# Patient Record
Sex: FEMALE | Race: ASIAN | Employment: UNEMPLOYED | URBAN - METROPOLITAN AREA
[De-identification: names, ages, dates, MRNs, and addresses within clinical notes are randomized per-mention and may not be internally consistent; named-entity substitution may affect disease eponyms.]

---

## 2017-01-18 PROBLEM — D56.0 ALPHA THALASSEMIA (HCC): Status: ACTIVE | Noted: 2017-01-18

## 2017-01-28 ENCOUNTER — APPOINTMENT (OUTPATIENT)
Dept: LAB | Facility: HOSPITAL | Age: 32
End: 2017-01-28
Attending: OBSTETRICS & GYNECOLOGY
Payer: COMMERCIAL

## 2017-01-28 DIAGNOSIS — Z36.9 ENCOUNTER FOR ANTENATAL SCREENING OF MOTHER: ICD-10-CM

## 2017-01-28 PROCEDURE — 36415 COLL VENOUS BLD VENIPUNCTURE: CPT

## 2017-01-28 PROCEDURE — 81511 FTL CGEN ABNOR FOUR ANAL: CPT

## 2017-01-30 LAB
FAMILY HISTORY OF ANEUPLOIDY: NO
FAMILY HX NEURAL TUBE DEFECT: NO
GESTATIONAL AGE (EXACT): 15.86 WEEKS
INSULIN REQ MATERNAL DIABETES: NO
MATERNAL AGE OF DELIVERY: 31.1 YR
MATERNAL SCREEN INTERPRETATION: NORMAL
MATERNAL WEIGHT: 151 LBS
MOM FOR AFP: 1.66
MOM FOR DIA: 1.32
MOM FOR HCG: 1.28
MOM FOR UE3: 1.02
PATIENT'S AFP: 53 NG/ML
PATIENT'S DIA: 227 PG/ML
PATIENT'S HCG: NORMAL IU/L
PATIENT'S UE3: 0.86 NG/ML

## 2017-02-02 ENCOUNTER — OFFICE VISIT (OUTPATIENT)
Dept: GENETICS | Facility: HOSPITAL | Age: 32
End: 2017-02-02
Attending: GENETIC COUNSELOR, MS
Payer: COMMERCIAL

## 2017-02-02 DIAGNOSIS — D56.0 ALPHA THALASSEMIA (HCC): Primary | ICD-10-CM

## 2017-02-02 PROCEDURE — 96040 HC GENETIC COUNSELING EA 30 MIN: CPT | Performed by: GENETIC COUNSELOR, MS

## 2017-02-02 NOTE — PROGRESS NOTES
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   Ms. Leonela Bonner is a  1, para 0 woman of Asi grandmother had two children who  in the  period or early childhood for unknown reasons. Ms. Lj Kincaid paternal aunt had two unexplained  demises. Ms. Lj Kincaid sister reportedly has alpha-thalassemia trait.   Mr. Stephani Fischer is an only This latter finding differentiates the carrier states of alpha- and beta-thalassemia. Individuals with a single functional alpha-globin gene have hemoglobin H disease. Hemoglobin H disease is characterized by hemolytic anemia with variable severity.   I alpha-thalassemia. Summary and Plan:  Ms. Roshan Howe was referred for genetic counseling because she has cis alpha-thalassemia trait. She is currently 16 weeks pregnant.   Her ’s CBC is not suspicious for alpha-thalassemia trait, but does not

## 2017-02-25 ENCOUNTER — HOSPITAL ENCOUNTER (OUTPATIENT)
Dept: ULTRASOUND IMAGING | Facility: HOSPITAL | Age: 32
Discharge: HOME | End: 2017-02-25
Attending: OBSTETRICS & GYNECOLOGY
Payer: COMMERCIAL

## 2017-02-25 DIAGNOSIS — Z36.89 ENCOUNTER FOR FETAL ANATOMIC SURVEY: ICD-10-CM

## 2017-02-25 PROCEDURE — 76811 OB US DETAILED SNGL FETUS: CPT

## 2017-03-10 ENCOUNTER — OFFICE VISIT (OUTPATIENT)
Dept: PHYSICAL THERAPY | Age: 32
End: 2017-03-10
Attending: OBSTETRICS & GYNECOLOGY
Payer: COMMERCIAL

## 2017-03-10 DIAGNOSIS — O26.899 LOW BACK PAIN DURING PREGNANCY, UNSPECIFIED TRIMESTER: Primary | ICD-10-CM

## 2017-03-10 DIAGNOSIS — M54.50 LOW BACK PAIN DURING PREGNANCY, UNSPECIFIED TRIMESTER: Primary | ICD-10-CM

## 2017-03-10 PROCEDURE — 97161 PT EVAL LOW COMPLEX 20 MIN: CPT

## 2017-03-10 PROCEDURE — 97110 THERAPEUTIC EXERCISES: CPT

## 2017-03-10 NOTE — PROGRESS NOTES
SPINE EVALUATION:   Referring Physician: Dr. Ana Saunders  Diagnosis: Back pain     Date of Service: 3/10/2017     PATIENT SUMMARY   Staci Negro is a 28year old y/o female who presents to therapy today with complaints of low back pain for 2 months.  Pain is co extension: R 4/5; L 4/5   PF: R 4/5; L 4/5  DF: R 4/5; L 4/5     Flexibility:   LE   Hip Flexor: normal  Hamstrings: 60 deg  Piriformis:normal  Quads: normal  Gastroc-soleus: normal     Balance: Normal    Today’s Treatment and Response: Hook lying LTR, jimena

## 2017-03-14 ENCOUNTER — OFFICE VISIT (OUTPATIENT)
Dept: PHYSICAL THERAPY | Age: 32
End: 2017-03-14
Attending: OBSTETRICS & GYNECOLOGY
Payer: COMMERCIAL

## 2017-03-14 PROCEDURE — 97110 THERAPEUTIC EXERCISES: CPT

## 2017-03-14 PROCEDURE — 97112 NEUROMUSCULAR REEDUCATION: CPT

## 2017-03-14 NOTE — PROGRESS NOTES
Dx: Low back pain         Authorized # of Visits:  8        Next MD visit:   Fall Risk: standard         Precautions: Pregnancy 23 weeks             Subjective: Low back pain over the weekend ranging from 3-5/10. Purchased a support belt which helps a lot.

## 2017-03-22 ENCOUNTER — OFFICE VISIT (OUTPATIENT)
Dept: PHYSICAL THERAPY | Age: 32
End: 2017-03-22
Attending: OBSTETRICS & GYNECOLOGY
Payer: COMMERCIAL

## 2017-03-22 PROCEDURE — 97110 THERAPEUTIC EXERCISES: CPT

## 2017-03-22 PROCEDURE — 97112 NEUROMUSCULAR REEDUCATION: CPT

## 2017-03-23 NOTE — PROGRESS NOTES
Dx: Low back pain         Authorized # of Visits:  8        Next MD visit:   Fall Risk: standard         Precautions: Pregnancy 23 weeks             Subjective: Slight pain in the low back on getting up in the morning and getting up from sitting.  Once up a Wall slides x 10 Red t band rows x 20        Standing with 2# wts R/L x 10 each  Alternating sh flexion  Scapular plane abduction  B shoulder  ER         Standing red t band rows x 20         Hamstring stretch R/L 2 x 30 sec hold         Skilled Services

## 2017-04-10 ENCOUNTER — APPOINTMENT (OUTPATIENT)
Dept: PHYSICAL THERAPY | Age: 32
End: 2017-04-10
Attending: OBSTETRICS & GYNECOLOGY
Payer: COMMERCIAL

## 2017-04-12 ENCOUNTER — OFFICE VISIT (OUTPATIENT)
Dept: PHYSICAL THERAPY | Age: 32
End: 2017-04-12
Attending: OBSTETRICS & GYNECOLOGY
Payer: COMMERCIAL

## 2017-04-12 PROCEDURE — 97140 MANUAL THERAPY 1/> REGIONS: CPT

## 2017-04-12 PROCEDURE — 97112 NEUROMUSCULAR REEDUCATION: CPT

## 2017-04-12 PROCEDURE — 97110 THERAPEUTIC EXERCISES: CPT

## 2017-04-13 NOTE — PROGRESS NOTES
Dx: Low back pain         Authorized # of Visits:  8        Next MD visit:   Fall Risk: standard         Precautions: Pregnancy 27 weeks, no supine or prone lying             Subjective: Slight pain in the low back on getting up in the morning and getting bracing x 10  Sit back stretch with neutral spine x 10 Seated LAQ R/L x 10  Hip flexion R/L x 10       4 pt kneeling abd bracing 10 x 5 sec hold Standing alternating sh flexion with 2# wts x 10  B scapular plane abd with 2# wts x 10  B sh ER with 2# wts x

## 2017-04-15 ENCOUNTER — APPOINTMENT (OUTPATIENT)
Dept: LAB | Facility: HOSPITAL | Age: 32
End: 2017-04-15
Attending: OBSTETRICS & GYNECOLOGY
Payer: COMMERCIAL

## 2017-04-15 DIAGNOSIS — Z11.3 SCREENING EXAMINATION FOR VENEREAL DISEASE: ICD-10-CM

## 2017-04-15 DIAGNOSIS — Z36.9 ENCOUNTER FOR ANTENATAL SCREENING OF MOTHER: ICD-10-CM

## 2017-04-15 PROCEDURE — 85018 HEMOGLOBIN: CPT

## 2017-04-15 PROCEDURE — 82950 GLUCOSE TEST: CPT

## 2017-04-15 PROCEDURE — 85014 HEMATOCRIT: CPT

## 2017-04-15 PROCEDURE — 86780 TREPONEMA PALLIDUM: CPT

## 2017-04-15 PROCEDURE — 36415 COLL VENOUS BLD VENIPUNCTURE: CPT

## 2017-04-17 ENCOUNTER — APPOINTMENT (OUTPATIENT)
Dept: PHYSICAL THERAPY | Age: 32
End: 2017-04-17
Attending: OBSTETRICS & GYNECOLOGY
Payer: COMMERCIAL

## 2017-04-19 ENCOUNTER — OFFICE VISIT (OUTPATIENT)
Dept: PHYSICAL THERAPY | Age: 32
End: 2017-04-19
Attending: OBSTETRICS & GYNECOLOGY
Payer: COMMERCIAL

## 2017-04-19 PROCEDURE — 97112 NEUROMUSCULAR REEDUCATION: CPT

## 2017-04-19 PROCEDURE — 97530 THERAPEUTIC ACTIVITIES: CPT

## 2017-04-19 PROCEDURE — 97140 MANUAL THERAPY 1/> REGIONS: CPT

## 2017-04-20 NOTE — PROGRESS NOTES
Dx: Low back pain         Authorized # of Visits:  8        Next MD visit:   Fall Risk: standard         Precautions: Pregnancy 27 weeks, no supine or prone lying             Subjective: Slight pain in the low back on getting up in the morning and getting clam R/L x 20 Standing lumbar extension stretch x 10 Wall slides 2 x 10      Side lying hip abd R/L x 10 4 pt kneeling abd bracing x 10  Sit back stretch with neutral spine x 10 Seated LAQ R/L x 10  Hip flexion R/L x 10 Side lying clam R/L 2 x 10  Hip abd

## 2017-04-26 ENCOUNTER — OFFICE VISIT (OUTPATIENT)
Dept: PHYSICAL THERAPY | Age: 32
End: 2017-04-26
Attending: OBSTETRICS & GYNECOLOGY
Payer: COMMERCIAL

## 2017-04-26 PROCEDURE — 97112 NEUROMUSCULAR REEDUCATION: CPT

## 2017-04-26 PROCEDURE — 97140 MANUAL THERAPY 1/> REGIONS: CPT

## 2017-04-26 PROCEDURE — 97110 THERAPEUTIC EXERCISES: CPT

## 2017-04-27 NOTE — PROGRESS NOTES
Dx: Low back pain         Authorized # of Visits:  8        Next MD visit: every 4 weeks  Fall Risk: standard         Precautions: Pregnancy 29 weeks, no supine or prone lying             Subjective: Slight pain in the low back on getting up in the morning Side lying clam R/L x 20  Hip abd R/L x 20 Sit to stand x 10 Kneeling sit back stretch with neutral spine x 10     Side lying clam R/L x 10 Side lying clam R/L x 20 Standing lumbar extension stretch x 10 Wall slides 2 x 10 Sit to stand with arms outstretch

## 2017-05-10 ENCOUNTER — OFFICE VISIT (OUTPATIENT)
Dept: PHYSICAL THERAPY | Age: 32
End: 2017-05-10
Attending: OBSTETRICS & GYNECOLOGY
Payer: COMMERCIAL

## 2017-05-10 PROCEDURE — 97110 THERAPEUTIC EXERCISES: CPT

## 2017-05-10 PROCEDURE — 97140 MANUAL THERAPY 1/> REGIONS: CPT

## 2017-05-10 PROCEDURE — 97112 NEUROMUSCULAR REEDUCATION: CPT

## 2017-05-10 NOTE — PROGRESS NOTES
Dx: Low back pain         Authorized # of Visits:  8        Next MD visit: every 4 weeks  Fall Risk: standard         Precautions: Pregnancy 29 weeks, no supine or prone lying             Subjective: Slight pain in the low back on getting up in the morning Hook lying bridging x 10 Hook lying bridging x 20  Bent leg lift x 20 Side lying clam R/L x 20  Hip abd R/L x 20 Sit to stand x 10 Kneeling sit back stretch with neutral spine x 10 Sit to stand x 20    Side lying clam R/L x 10 Side lying clam R/L x 20 St Timed Treatment: 45 min  Total Treatment Time: 45 min

## 2017-06-20 PROCEDURE — 87186 SC STD MICRODIL/AGAR DIL: CPT | Performed by: OBSTETRICS & GYNECOLOGY

## 2017-06-20 PROCEDURE — 87147 CULTURE TYPE IMMUNOLOGIC: CPT | Performed by: OBSTETRICS & GYNECOLOGY

## 2017-06-20 PROCEDURE — 87081 CULTURE SCREEN ONLY: CPT | Performed by: OBSTETRICS & GYNECOLOGY

## 2017-06-23 PROBLEM — O99.820 GBS (GROUP B STREPTOCOCCUS CARRIER), +RV CULTURE, CURRENTLY PREGNANT: Status: ACTIVE | Noted: 2017-06-23

## 2017-06-23 PROBLEM — O99.820 GBS (GROUP B STREPTOCOCCUS CARRIER), +RV CULTURE, CURRENTLY PREGNANT (HCC): Status: ACTIVE | Noted: 2017-06-23

## 2017-06-27 ENCOUNTER — TELEPHONE (OUTPATIENT)
Dept: OBGYN UNIT | Facility: HOSPITAL | Age: 32
End: 2017-06-27

## 2017-06-27 ENCOUNTER — OFFICE VISIT (OUTPATIENT)
Dept: PHYSICAL THERAPY | Age: 32
End: 2017-06-27
Attending: OBSTETRICS & GYNECOLOGY
Payer: COMMERCIAL

## 2017-06-27 PROCEDURE — 97140 MANUAL THERAPY 1/> REGIONS: CPT

## 2017-06-27 PROCEDURE — 97110 THERAPEUTIC EXERCISES: CPT

## 2017-06-27 NOTE — PROGRESS NOTES
Dx: Low back pain         Authorized # of Visits:  8        Next MD visit: every 4 weeks  Fall Risk: standard         Precautions: Pregnancy 37 weeks, no supine or prone lying             Subjective: LBP remains mild, no worse as pregnancy has progressed. to stand x 10 Kneeling sit back stretch with neutral spine x 10 Sit to stand x 20 Wall wipes with SB x 10   Side lying clam R/L x 10 Side lying clam R/L x 20 Standing lumbar extension stretch x 10 Wall slides 2 x 10 Sit to stand with arms outstretched 2 x Skilled Services: Stretching, scapula and LE strengthening, [facility]    Charges: EX 1,  MT 2      Total Timed Treatment: 45 min  Total Treatment Time: 45 min

## 2017-06-28 ENCOUNTER — APPOINTMENT (OUTPATIENT)
Dept: OBGYN CLINIC | Facility: HOSPITAL | Age: 32
End: 2017-06-28
Payer: COMMERCIAL

## 2017-06-28 ENCOUNTER — HOSPITAL ENCOUNTER (OUTPATIENT)
Facility: HOSPITAL | Age: 32
Setting detail: OBSERVATION
Discharge: HOME | End: 2017-06-28
Attending: OBSTETRICS & GYNECOLOGY | Admitting: OBSTETRICS & GYNECOLOGY
Payer: COMMERCIAL

## 2017-06-28 VITALS
RESPIRATION RATE: 18 BRPM | SYSTOLIC BLOOD PRESSURE: 102 MMHG | DIASTOLIC BLOOD PRESSURE: 65 MMHG | BODY MASS INDEX: 31.07 KG/M2 | TEMPERATURE: 99 F | HEIGHT: 64 IN | HEART RATE: 87 BPM | WEIGHT: 182 LBS

## 2017-06-28 PROBLEM — Z34.90 PREGNANCY (HCC): Status: ACTIVE | Noted: 2017-06-28

## 2017-06-28 PROBLEM — Z34.90 PREGNANCY: Status: ACTIVE | Noted: 2017-06-28

## 2017-06-28 PROCEDURE — 59412 ANTEPARTUM MANIPULATION: CPT

## 2017-06-28 PROCEDURE — 96372 THER/PROPH/DIAG INJ SC/IM: CPT

## 2017-06-28 PROCEDURE — 10S0XZZ REPOSITION PRODUCTS OF CONCEPTION, EXTERNAL APPROACH: ICD-10-PCS | Performed by: OBSTETRICS & GYNECOLOGY

## 2017-06-28 RX ORDER — TERBUTALINE SULFATE 1 MG/ML
0.25 INJECTION, SOLUTION SUBCUTANEOUS ONCE
Status: COMPLETED | OUTPATIENT
Start: 2017-06-28 | End: 2017-06-28

## 2017-06-28 NOTE — PROCEDURES
ECV    NST reactive prior to procedure  Terbutaline given  Attempted backward clockwise roll x2 without success. FHT assessed by ultrasound between attempts with reassuring FHT noted  Then attempted forward counterclockwise roll x2 without success.     Stalin Painter

## 2017-06-28 NOTE — PROGRESS NOTES
Pt. Discharged home in stable condition. Pt. To f/u at the office for next scheduled appointment. Labor precautions d/w pt, who verbalized understanding.

## 2017-06-28 NOTE — H&P
OB H&P    32 yo  at 37w3d admitted for ECV for breech presentation. Denies contractions, no lof, no vb, +FM. Prenatal course complicated by GBS positive and alpha thalassemia trait.         OB History    Para Term  AB Living   1 0 0 0 0 epidural, patient declines.         Marlin Pat

## 2017-06-28 NOTE — PROGRESS NOTES
S/P attempted version. EFM and TOCO applied to monitor pt x1 hour before dischare home as ordered. ;

## 2017-06-28 NOTE — PROGRESS NOTES
Pt is a 33year old female admitted to 111/-A. Patient presents with:  External Version     Pt is  37w3d intra-uterine pregnancy. History obtained, consents signed. Oriented to room, staff, and plan of care.

## 2017-07-07 ENCOUNTER — HOSPITAL ENCOUNTER (OUTPATIENT)
Facility: HOSPITAL | Age: 32
Setting detail: OBSERVATION
Discharge: HOME | End: 2017-07-07
Attending: OBSTETRICS & GYNECOLOGY | Admitting: OBSTETRICS & GYNECOLOGY
Payer: COMMERCIAL

## 2017-07-07 VITALS
DIASTOLIC BLOOD PRESSURE: 72 MMHG | WEIGHT: 183 LBS | TEMPERATURE: 99 F | BODY MASS INDEX: 31.24 KG/M2 | HEIGHT: 64 IN | SYSTOLIC BLOOD PRESSURE: 118 MMHG | HEART RATE: 75 BPM

## 2017-07-07 PROBLEM — Z34.90 PREGNANT (HCC): Status: ACTIVE | Noted: 2017-07-07

## 2017-07-07 PROBLEM — Z34.90 PREGNANT: Status: ACTIVE | Noted: 2017-07-07

## 2017-07-07 PROCEDURE — 59025 FETAL NON-STRESS TEST: CPT

## 2017-07-07 PROCEDURE — 84112 EVAL AMNIOTIC FLUID PROTEIN: CPT

## 2017-07-07 RX ORDER — MELATONIN
325
COMMUNITY
End: 2017-07-25

## 2017-07-07 NOTE — PROGRESS NOTES
Pt came to L&D with poss ROM. Did have 2 episodes of fluid from vagina that ran down upper leg.  Nothing since she has been in the hospital.   O:  Nursing performed spec exam- nitrazine negative, ferning negative, pooling negative, ROM+ positive  Bedside u/

## 2017-07-07 NOTE — PROGRESS NOTES
8703 pt admit to tr3 with c/o fluid running down her leg when she got out of bed at 6am, she voided, and states fluid remains, wore pad in and has some wetness and sm old bld.  Hx and assessment done, plan f care diiscussed with pt and , orient to ro

## 2017-07-07 NOTE — PROGRESS NOTES
Discharge instructions given, reactive NST, one drop of bld on toilet paper when she wiped, informed may be from spec exam or vag exam.. If continues call MD  Discharge ambulatory in good condition with

## 2017-07-07 NOTE — PROGRESS NOTES
1040 pt return from walking, states no further leakage, pad dry, efm reapplied, Dr Christina Obrien comes by, informed states discharge home when strip reactive again.

## 2017-07-07 NOTE — PROGRESS NOTES
Dr Rosanne Velazquez at bedside, u/s, remains breech and see md notes for fluid amt. Pt up to ambulate for one hour, to come back to room in 30 min for FHR. wearing pad to collect fluid if leaking

## 2017-07-11 ENCOUNTER — SURGERY (OUTPATIENT)
Age: 32
End: 2017-07-11

## 2017-07-11 ENCOUNTER — HOSPITAL ENCOUNTER (INPATIENT)
Facility: HOSPITAL | Age: 32
LOS: 3 days | Discharge: HOME | End: 2017-07-14
Attending: OBSTETRICS & GYNECOLOGY | Admitting: OBSTETRICS & GYNECOLOGY
Payer: COMMERCIAL

## 2017-07-11 ENCOUNTER — ANESTHESIA EVENT (OUTPATIENT)
Dept: OBGYN UNIT | Facility: HOSPITAL | Age: 32
End: 2017-07-11

## 2017-07-11 ENCOUNTER — ANESTHESIA (OUTPATIENT)
Dept: OBGYN UNIT | Facility: HOSPITAL | Age: 32
End: 2017-07-11

## 2017-07-11 PROBLEM — Z98.891 STATUS POST CESAREAN SECTION: Status: ACTIVE | Noted: 2017-07-11

## 2017-07-11 LAB
ANTIBODY SCREEN: NEGATIVE
BASOPHILS # BLD AUTO: 0.03 X10(3) UL (ref 0–0.1)
BASOPHILS NFR BLD AUTO: 0.3 %
BILIRUBIN URINE: NEGATIVE
CONTROL RUN WITHIN 24 HOURS?: YES
EOSINOPHIL # BLD AUTO: 0.07 X10(3) UL (ref 0–0.3)
EOSINOPHIL NFR BLD AUTO: 0.6 %
ERYTHROCYTE [DISTWIDTH] IN BLOOD BY AUTOMATED COUNT: 15.1 % (ref 11.5–16)
GLUCOSE URINE: NEGATIVE
HCT VFR BLD AUTO: 40.6 % (ref 34–50)
HGB BLD-MCNC: 13 G/DL (ref 12–16)
IMMATURE GRANULOCYTE COUNT: 0.07 X10(3) UL (ref 0–1)
IMMATURE GRANULOCYTE RATIO %: 0.6 %
LYMPHOCYTES # BLD AUTO: 1.48 X10(3) UL (ref 0.9–4)
LYMPHOCYTES NFR BLD AUTO: 13.6 %
MCH RBC QN AUTO: 21.4 PG (ref 27–33.2)
MCHC RBC AUTO-ENTMCNC: 32 G/DL (ref 31–37)
MCV RBC AUTO: 66.9 FL (ref 81–100)
MONOCYTES # BLD AUTO: 0.68 X10(3) UL (ref 0.1–0.6)
MONOCYTES NFR BLD AUTO: 6.2 %
NEUTROPHIL ABS PRELIM: 8.58 X10 (3) UL (ref 1.3–6.7)
NEUTROPHILS # BLD AUTO: 8.58 X10(3) UL (ref 1.3–6.7)
NEUTROPHILS NFR BLD AUTO: 78.7 %
NITRITE URINE: NEGATIVE
PH URINE: 6 (ref 5–8)
PLATELET # BLD AUTO: 160 10(3)UL (ref 150–450)
PROTEIN URINE: NEGATIVE
RBC # BLD AUTO: 6.07 X10(6)UL (ref 3.8–5.1)
RED CELL DISTRIBUTION WIDTH-SD: 35.3 FL (ref 35.1–46.3)
RH BLOOD TYPE: POSITIVE
SPEC GRAVITY: 1.01 (ref 1–1.03)
T PALLIDUM AB SER QL IA: NONREACTIVE
UROBILINOGEN URINE: 0.2
WBC # BLD AUTO: 10.9 X10(3) UL (ref 4–13)

## 2017-07-11 PROCEDURE — 86900 BLOOD TYPING SEROLOGIC ABO: CPT | Performed by: OBSTETRICS & GYNECOLOGY

## 2017-07-11 PROCEDURE — 86780 TREPONEMA PALLIDUM: CPT | Performed by: OBSTETRICS & GYNECOLOGY

## 2017-07-11 PROCEDURE — 85025 COMPLETE CBC W/AUTO DIFF WBC: CPT | Performed by: OBSTETRICS & GYNECOLOGY

## 2017-07-11 PROCEDURE — 86850 RBC ANTIBODY SCREEN: CPT | Performed by: OBSTETRICS & GYNECOLOGY

## 2017-07-11 PROCEDURE — 81002 URINALYSIS NONAUTO W/O SCOPE: CPT

## 2017-07-11 PROCEDURE — 86901 BLOOD TYPING SEROLOGIC RH(D): CPT | Performed by: OBSTETRICS & GYNECOLOGY

## 2017-07-11 RX ORDER — CLINDAMYCIN PHOSPHATE 900 MG/50ML
INJECTION INTRAVENOUS
Status: DISCONTINUED | OUTPATIENT
Start: 2017-07-11 | End: 2017-07-14

## 2017-07-11 RX ORDER — ONDANSETRON 2 MG/ML
4 INJECTION INTRAMUSCULAR; INTRAVENOUS ONCE AS NEEDED
Status: DISCONTINUED | OUTPATIENT
Start: 2017-07-11 | End: 2017-07-11 | Stop reason: HOSPADM

## 2017-07-11 RX ORDER — GENTAMICIN SULFATE 40 MG/ML
INJECTION, SOLUTION INTRAMUSCULAR; INTRAVENOUS
Status: DISPENSED
Start: 2017-07-11 | End: 2017-07-11

## 2017-07-11 RX ORDER — SODIUM CHLORIDE, SODIUM LACTATE, POTASSIUM CHLORIDE, CALCIUM CHLORIDE 600; 310; 30; 20 MG/100ML; MG/100ML; MG/100ML; MG/100ML
INJECTION, SOLUTION INTRAVENOUS CONTINUOUS
Status: DISCONTINUED | OUTPATIENT
Start: 2017-07-11 | End: 2017-07-14

## 2017-07-11 RX ORDER — CLINDAMYCIN PHOSPHATE 900 MG/50ML
900 INJECTION INTRAVENOUS EVERY 8 HOURS
Status: DISCONTINUED | OUTPATIENT
Start: 2017-07-11 | End: 2017-07-11

## 2017-07-11 RX ORDER — BISACODYL 10 MG
10 SUPPOSITORY, RECTAL RECTAL
Status: DISCONTINUED | OUTPATIENT
Start: 2017-07-11 | End: 2017-07-14

## 2017-07-11 RX ORDER — KETOROLAC TROMETHAMINE 30 MG/ML
30 INJECTION, SOLUTION INTRAMUSCULAR; INTRAVENOUS ONCE AS NEEDED
Status: COMPLETED | OUTPATIENT
Start: 2017-07-11 | End: 2017-07-11

## 2017-07-11 RX ORDER — IBUPROFEN 600 MG/1
600 TABLET ORAL EVERY 6 HOURS SCHEDULED
Status: DISCONTINUED | OUTPATIENT
Start: 2017-07-12 | End: 2017-07-14

## 2017-07-11 RX ORDER — NALOXONE HYDROCHLORIDE 0.4 MG/ML
0.08 INJECTION, SOLUTION INTRAMUSCULAR; INTRAVENOUS; SUBCUTANEOUS
Status: ACTIVE | OUTPATIENT
Start: 2017-07-11 | End: 2017-07-12

## 2017-07-11 RX ORDER — HYDROCODONE BITARTRATE AND ACETAMINOPHEN 5; 325 MG/1; MG/1
1 TABLET ORAL EVERY 4 HOURS PRN
Status: DISCONTINUED | OUTPATIENT
Start: 2017-07-11 | End: 2017-07-14

## 2017-07-11 RX ORDER — ZOLPIDEM TARTRATE 5 MG/1
5 TABLET ORAL NIGHTLY PRN
Status: DISCONTINUED | OUTPATIENT
Start: 2017-07-11 | End: 2017-07-14

## 2017-07-11 RX ORDER — NALBUPHINE HCL 10 MG/ML
2.5 AMPUL (ML) INJECTION
Status: DISCONTINUED | OUTPATIENT
Start: 2017-07-11 | End: 2017-07-11 | Stop reason: HOSPADM

## 2017-07-11 RX ORDER — DIPHENHYDRAMINE HCL 25 MG
25 CAPSULE ORAL EVERY 4 HOURS PRN
Status: DISCONTINUED | OUTPATIENT
Start: 2017-07-11 | End: 2017-07-14

## 2017-07-11 RX ORDER — DEXTROSE, SODIUM CHLORIDE, SODIUM LACTATE, POTASSIUM CHLORIDE, AND CALCIUM CHLORIDE 5; .6; .31; .03; .02 G/100ML; G/100ML; G/100ML; G/100ML; G/100ML
INJECTION, SOLUTION INTRAVENOUS CONTINUOUS
Status: DISCONTINUED | OUTPATIENT
Start: 2017-07-11 | End: 2017-07-14

## 2017-07-11 RX ORDER — GENTAMICIN SULFATE 40 MG/ML
5 INJECTION, SOLUTION INTRAMUSCULAR; INTRAVENOUS ONCE
Status: DISCONTINUED | OUTPATIENT
Start: 2017-07-11 | End: 2017-07-11

## 2017-07-11 RX ORDER — SIMETHICONE 80 MG
80 TABLET,CHEWABLE ORAL 3 TIMES DAILY PRN
Status: DISCONTINUED | OUTPATIENT
Start: 2017-07-11 | End: 2017-07-14

## 2017-07-11 RX ORDER — DIPHENHYDRAMINE HYDROCHLORIDE 50 MG/ML
25 INJECTION INTRAMUSCULAR; INTRAVENOUS ONCE AS NEEDED
Status: DISCONTINUED | OUTPATIENT
Start: 2017-07-11 | End: 2017-07-11 | Stop reason: HOSPADM

## 2017-07-11 RX ORDER — CLINDAMYCIN PHOSPHATE 900 MG/50ML
900 INJECTION INTRAVENOUS ONCE
Status: DISCONTINUED | OUTPATIENT
Start: 2017-07-11 | End: 2017-07-11

## 2017-07-11 RX ORDER — SODIUM CHLORIDE, SODIUM LACTATE, POTASSIUM CHLORIDE, CALCIUM CHLORIDE 600; 310; 30; 20 MG/100ML; MG/100ML; MG/100ML; MG/100ML
INJECTION, SOLUTION INTRAVENOUS CONTINUOUS
Status: DISCONTINUED | OUTPATIENT
Start: 2017-07-11 | End: 2017-07-11

## 2017-07-11 RX ORDER — HYDROMORPHONE HYDROCHLORIDE 1 MG/ML
0.4 INJECTION, SOLUTION INTRAMUSCULAR; INTRAVENOUS; SUBCUTANEOUS EVERY 5 MIN PRN
Status: DISCONTINUED | OUTPATIENT
Start: 2017-07-11 | End: 2017-07-11 | Stop reason: HOSPADM

## 2017-07-11 RX ORDER — DOCUSATE SODIUM 100 MG/1
100 CAPSULE, LIQUID FILLED ORAL
Status: DISCONTINUED | OUTPATIENT
Start: 2017-07-12 | End: 2017-07-14

## 2017-07-11 RX ORDER — NALBUPHINE HCL 10 MG/ML
2.5 AMPUL (ML) INJECTION EVERY 4 HOURS PRN
Status: DISCONTINUED | OUTPATIENT
Start: 2017-07-11 | End: 2017-07-14

## 2017-07-11 RX ORDER — KETOROLAC TROMETHAMINE 30 MG/ML
30 INJECTION, SOLUTION INTRAMUSCULAR; INTRAVENOUS EVERY 6 HOURS PRN
Status: DISPENSED | OUTPATIENT
Start: 2017-07-11 | End: 2017-07-12

## 2017-07-11 RX ORDER — ONDANSETRON 2 MG/ML
4 INJECTION INTRAMUSCULAR; INTRAVENOUS EVERY 6 HOURS PRN
Status: DISCONTINUED | OUTPATIENT
Start: 2017-07-11 | End: 2017-07-14

## 2017-07-11 RX ORDER — HYDROCODONE BITARTRATE AND ACETAMINOPHEN 10; 325 MG/1; MG/1
1 TABLET ORAL EVERY 4 HOURS PRN
Status: DISCONTINUED | OUTPATIENT
Start: 2017-07-11 | End: 2017-07-14

## 2017-07-11 RX ORDER — DIPHENHYDRAMINE HYDROCHLORIDE 50 MG/ML
12.5 INJECTION INTRAMUSCULAR; INTRAVENOUS EVERY 4 HOURS PRN
Status: DISCONTINUED | OUTPATIENT
Start: 2017-07-11 | End: 2017-07-14

## 2017-07-11 RX ORDER — SODIUM CHLORIDE 9 MG/ML
100 INJECTION, SOLUTION INTRAVENOUS ONCE
Status: DISCONTINUED | OUTPATIENT
Start: 2017-07-11 | End: 2017-07-11

## 2017-07-11 NOTE — H&P
[facility]  History & Physical    SUBJECTIVE:    Mary Ann Beltran is a 33year old  at 39w2d who presents in labor with breech presentation. 7 cm dilated. GBS pos, PCN allergy, sensitive to clinda.     Obstetric History:    Past Medical H

## 2017-07-11 NOTE — ANESTHESIA POSTPROCEDURE EVALUATION
[address] Patient Status:  Inpatient   Age/Gender 33year old female MRN [id number]   Location [address] [place] [address] Attending Angelica Bahena MD   Hosp Day # 0 PCP Unknown Pcp       Anesthesia Post-op Note    Procedure(s):

## 2017-07-11 NOTE — PROGRESS NOTES
Report given to Skyler Marie RN. Patient, infant, and  transferred to mother baby unit in stable condition via cart.

## 2017-07-11 NOTE — PROGRESS NOTES
[facility]  Post-Partum Caesarean Section Progress Note    Andrés Francois Patient Status:  Inpatient    1985 MRN [id number]   [facility] 2SW-J Attending Judi Ruiz MD   Hosp Day # 0 PCP Unknown Pcp       Subjective:  Postpartum Da

## 2017-07-11 NOTE — ANESTHESIA PREPROCEDURE EVALUATION
PRE-OP EVALUATION    Patient Name: James Corcoran    Pre-op Diagnosis: * No pre-op diagnosis entered *    Procedure(s):      Surgeon(s) and Role:     Kathryn Martinez MD - Primary    Pre-op vitals reviewed.   Pulse: 73  Resp: 16  BP: 118/70     Body mass index ovary     Smoking status: Never Smoker    Smokeless tobacco: Never Used    Alcohol use No       Drug use: No     Available pre-op labs reviewed.     Lab Results  Component Value Date   WBC 10.9 07/11/2017   RBC 6.07 (H) 07/11/2017   HGB 13.0 07/11/2017   HC

## 2017-07-11 NOTE — OPERATIVE REPORT
Operative Note    Preop diagnosis: 39w2d     Breech presentation     Labor   Postop diagnosis: Same  Procedure:  Primary low transverse  section  Surgeon:  Raymond Mina  Assistant:  Isadora Ruiz  Anesthesia:  Spinal  Findings:  Female infant weighing 7#12 with incision was closed with #1 chromic in a running-locking fashion. A second layer was imbricated using #1 chromic. Additional figure of eight sutures using 0 vicyl were placed for hemostasis. Good hemostasis was confirmed at the level of the uterus.  The pel

## 2017-07-11 NOTE — PROGRESS NOTES
Received the pt to the unit via w/c with c/o ctx. Pt to triage room to change and to void. Pt then into bed that is in the low locked position. efm explained and applied. Hx obtained. Pt is a 39.2 week iup that us a scheduled c/s for breech.   Pt fide

## 2017-07-12 LAB
BASOPHILS # BLD AUTO: 0.04 X10(3) UL (ref 0–0.1)
BASOPHILS NFR BLD AUTO: 0.4 %
EOSINOPHIL # BLD AUTO: 0.08 X10(3) UL (ref 0–0.3)
EOSINOPHIL NFR BLD AUTO: 0.8 %
ERYTHROCYTE [DISTWIDTH] IN BLOOD BY AUTOMATED COUNT: 14.9 % (ref 11.5–16)
HCT VFR BLD AUTO: 32.4 % (ref 34–50)
HGB BLD-MCNC: 10.3 G/DL (ref 12–16)
IMMATURE GRANULOCYTE COUNT: 0.09 X10(3) UL (ref 0–1)
IMMATURE GRANULOCYTE RATIO %: 0.9 %
LYMPHOCYTES # BLD AUTO: 1.27 X10(3) UL (ref 0.9–4)
LYMPHOCYTES NFR BLD AUTO: 12.1 %
MCH RBC QN AUTO: 21.6 PG (ref 27–33.2)
MCHC RBC AUTO-ENTMCNC: 31.8 G/DL (ref 31–37)
MCV RBC AUTO: 68.1 FL (ref 81–100)
MONOCYTES # BLD AUTO: 0.52 X10(3) UL (ref 0.1–0.6)
MONOCYTES NFR BLD AUTO: 5 %
NEUTROPHIL ABS PRELIM: 8.5 X10 (3) UL (ref 1.3–6.7)
NEUTROPHILS # BLD AUTO: 8.5 X10(3) UL (ref 1.3–6.7)
NEUTROPHILS NFR BLD AUTO: 80.8 %
PLATELET # BLD AUTO: 131 10(3)UL (ref 150–450)
RBC # BLD AUTO: 4.76 X10(6)UL (ref 3.8–5.1)
RED CELL DISTRIBUTION WIDTH-SD: 36.6 FL (ref 35.1–46.3)
WBC # BLD AUTO: 10.5 X10(3) UL (ref 4–13)

## 2017-07-12 PROCEDURE — 85025 COMPLETE CBC W/AUTO DIFF WBC: CPT | Performed by: OBSTETRICS & GYNECOLOGY

## 2017-07-12 NOTE — PROGRESS NOTES
[facility]    Patients Name: Sharon Freeman  Attending Physician: Saskia Beck MD  CSN: [id number]    Location:  2008/4832-E  MRN: [id number]    YOB: 1985  Admission Date: 7/11/2017     Obstetric Anesthesia Pain Progress Note    Post-Op Day

## 2017-07-12 NOTE — PROGRESS NOTES
Postpartum Progress Note    SUBJECTIVE:    Post-op day #1 s/p primary  section. No overnight events. No complaints. Has been out of bed, tolerating PO, pillai out this morning, + flatus. Breastfeeding.       OBJECTIVE:    Vital signs in last 24

## 2017-07-13 NOTE — PROGRESS NOTES
Postpartum Progress Note    SUBJECTIVE:    Post-op day #2 s/p primary  section. No overnight events. No complaints. Ambulating, tolerating PO, voiding, + flatus. Breastfeeding but having difficulty with latch.       OBJECTIVE:    Vital signs i

## 2017-07-14 VITALS
RESPIRATION RATE: 18 BRPM | HEART RATE: 69 BPM | OXYGEN SATURATION: 97 % | TEMPERATURE: 98 F | DIASTOLIC BLOOD PRESSURE: 81 MMHG | SYSTOLIC BLOOD PRESSURE: 131 MMHG | WEIGHT: 183 LBS | HEIGHT: 64 IN | BODY MASS INDEX: 31.24 KG/M2

## 2017-07-14 PROBLEM — Z34.90 PREGNANCY (HCC): Status: RESOLVED | Noted: 2017-06-28 | Resolved: 2017-07-14

## 2017-07-14 PROBLEM — Z34.90 PREGNANCY: Status: RESOLVED | Noted: 2017-06-28 | Resolved: 2017-07-14

## 2017-07-14 RX ORDER — HYDROCODONE BITARTRATE AND ACETAMINOPHEN 5; 325 MG/1; MG/1
1-2 TABLET ORAL EVERY 4 HOURS PRN
Qty: 30 TABLET | Refills: 0 | Status: SHIPPED | OUTPATIENT
Start: 2017-07-14 | End: 2017-07-25

## 2017-07-14 NOTE — PLAN OF CARE
BREAST FEEDING    • Optimize infant feeding at the breast Completed        POSTPARTUM    • Long Term Goal:Experiences normal postpartum course Completed    • Optimize infant feeding at the breast Completed    • Appropriate maternal -  bonding Comple

## 2017-07-14 NOTE — DISCHARGE SUMMARY
Admission date: 17  Discharge date: 17  Admission diagnosis: term pregnancy, breech presentation  Discharge diagnosis: same  Operative Procedure: primary low transverse  section  Hospital course: uncomplicated  Discharge medications: Norc

## 2017-07-14 NOTE — PLAN OF CARE
Problem: POSTPARTUM  Goal: Optimize infant feeding at the breast  INTERVENTIONS:  - Initiate breast feeding within first hour after birth. - Monitor effectiveness of current breast feeding efforts. - Assess support systems available to mother/family.   - and previous experience with breast feeding.  - Provide information as needed about early infant feeding cues (e.g., rooting, lip smacking, sucking fingers/hand) versus late cue of crying.  - Discuss/demonstrate breast feeding aids (e.g., infant sling, kirk

## 2017-07-14 NOTE — PROGRESS NOTES
Patient in stable condition, DC instructions completed , answered questions, and ready to go home, verbalized understanding

## 2017-07-14 NOTE — PROGRESS NOTES
S: pt without complaints.   Positive flatus  O: VS-Temp:  [98.4 °F (36.9 °C)-98.6 °F (37 °C)] 98.4 °F (36.9 °C)  Pulse:  [69-72] 72  Resp:  [19-20] 20  BP: (112-133)/(63-85) 133/85       Abdomen: soft, ND, NT  Incision- CDI       Extremities: 1+ edema, NT B

## 2017-07-17 ENCOUNTER — APPOINTMENT (OUTPATIENT)
Dept: LACTATION | Facility: HOSPITAL | Age: 32
End: 2017-07-17
Attending: OBSTETRICS & GYNECOLOGY
Payer: COMMERCIAL

## 2017-07-18 ENCOUNTER — NURSE ONLY (OUTPATIENT)
Dept: LACTATION | Facility: HOSPITAL | Age: 32
End: 2017-07-18
Attending: OBSTETRICS & GYNECOLOGY
Payer: COMMERCIAL

## 2017-07-18 ENCOUNTER — TELEPHONE (OUTPATIENT)
Dept: OBGYN UNIT | Facility: HOSPITAL | Age: 32
End: 2017-07-18

## 2017-07-18 VITALS — TEMPERATURE: 98 F

## 2017-07-18 DIAGNOSIS — O92.79 DISORDER OF LACTATION, POSTPARTUM CONDITION OR COMPLICATION: Primary | ICD-10-CM

## 2017-07-18 PROBLEM — O99.820 GBS (GROUP B STREPTOCOCCUS CARRIER), +RV CULTURE, CURRENTLY PREGNANT: Status: RESOLVED | Noted: 2017-06-23 | Resolved: 2017-07-11

## 2017-07-18 PROBLEM — D56.0 ALPHA THALASSEMIA (HCC): Status: RESOLVED | Noted: 2017-01-18 | Resolved: 2017-07-11

## 2017-07-18 PROBLEM — O99.820 GBS (GROUP B STREPTOCOCCUS CARRIER), +RV CULTURE, CURRENTLY PREGNANT (HCC): Status: RESOLVED | Noted: 2017-06-23 | Resolved: 2017-07-11

## 2017-07-18 PROCEDURE — 99212 OFFICE O/P EST SF 10 MIN: CPT

## 2017-07-19 ENCOUNTER — TELEPHONE (OUTPATIENT)
Dept: OBGYN UNIT | Facility: HOSPITAL | Age: 32
End: 2017-07-19

## 2019-06-04 ENCOUNTER — HOSPITAL ENCOUNTER (OUTPATIENT)
Age: 34
Discharge: HOME | End: 2019-06-04
Attending: EMERGENCY MEDICINE
Payer: COMMERCIAL

## 2019-06-04 VITALS
BODY MASS INDEX: 28.32 KG/M2 | TEMPERATURE: 98 F | WEIGHT: 170 LBS | DIASTOLIC BLOOD PRESSURE: 64 MMHG | HEIGHT: 65 IN | SYSTOLIC BLOOD PRESSURE: 109 MMHG | RESPIRATION RATE: 18 BRPM | HEART RATE: 86 BPM | OXYGEN SATURATION: 98 %

## 2019-06-04 DIAGNOSIS — S39.012A STRAIN OF LUMBAR REGION, INITIAL ENCOUNTER: Primary | ICD-10-CM

## 2019-06-04 PROCEDURE — 99213 OFFICE O/P EST LOW 20 MIN: CPT

## 2019-06-04 PROCEDURE — 99214 OFFICE O/P EST MOD 30 MIN: CPT

## 2019-06-04 RX ORDER — CYCLOBENZAPRINE HCL 10 MG
5 TABLET ORAL 3 TIMES DAILY PRN
Qty: 15 TABLET | Refills: 0 | Status: SHIPPED | OUTPATIENT
Start: 2019-06-04 | End: 2019-06-09

## 2019-06-04 NOTE — ED PROVIDER NOTES
Patient Seen in: [address]    History   Patient presents with:  Back Pain (musculoskeletal)    Stated Complaint: low back pain     HPI    Patient is a 67-year-old female with past history of intermittent back pain who pr Nonicteric sclera, no conjunctival injection  ENT: TMs are clear and flat bilaterally.   There is no posterior pharyngeal erythema  Chest: Clear to auscultation, no tenderness  Cardiovascular: Regular rate and rhythm without murmur  Abdomen: Soft, nontender

## 2019-06-04 NOTE — ED INITIAL ASSESSMENT (HPI)
REPORTS LOWER BACK PAIN SINCE Saturday AM.  DENIES TRAUMA/INJURY. DENIES LOSS OF BOWEL/BLADDER CONTROL . REPORTS SIMILAR SYMPTOMS IN THE PAST, STATES SHE WAS TREATED WITH FLEXERIL AND WAS IN PHYSICAL THERAPY FOR SOME TIME.

## 2020-01-24 ENCOUNTER — LAB ENCOUNTER (OUTPATIENT)
Dept: LAB | Facility: HOSPITAL | Age: 35
End: 2020-01-24
Attending: OBSTETRICS & GYNECOLOGY
Payer: COMMERCIAL

## 2020-01-24 DIAGNOSIS — Z36.9 ENCOUNTER FOR ANTENATAL SCREENING OF MOTHER: ICD-10-CM

## 2020-01-24 LAB
ANTIBODY SCREEN: NEGATIVE
BASOPHILS # BLD AUTO: 0.04 X10(3) UL (ref 0–0.2)
BASOPHILS NFR BLD AUTO: 0.5 %
DEPRECATED RDW RBC AUTO: 33.6 FL (ref 35.1–46.3)
EOSINOPHIL # BLD AUTO: 0.12 X10(3) UL (ref 0–0.7)
EOSINOPHIL NFR BLD AUTO: 1.5 %
ERYTHROCYTE [DISTWIDTH] IN BLOOD BY AUTOMATED COUNT: 15.1 % (ref 11–15)
HBV SURFACE AG SER-ACNC: <0.1 [IU]/L
HBV SURFACE AG SERPL QL IA: NONREACTIVE
HCT VFR BLD AUTO: 39.8 % (ref 35–48)
HGB BLD-MCNC: 12.7 G/DL (ref 12–16)
IMM GRANULOCYTES # BLD AUTO: 0.02 X10(3) UL (ref 0–1)
IMM GRANULOCYTES NFR BLD: 0.3 %
LYMPHOCYTES # BLD AUTO: 1.8 X10(3) UL (ref 1–4)
LYMPHOCYTES NFR BLD AUTO: 22.8 %
MCH RBC QN AUTO: 21.2 PG (ref 26–34)
MCHC RBC AUTO-ENTMCNC: 31.9 G/DL (ref 31–37)
MCV RBC AUTO: 66.3 FL (ref 80–100)
MONOCYTES # BLD AUTO: 0.44 X10(3) UL (ref 0.1–1)
MONOCYTES NFR BLD AUTO: 5.6 %
NEUTROPHILS # BLD AUTO: 5.48 X10 (3) UL (ref 1.5–7.7)
NEUTROPHILS # BLD AUTO: 5.48 X10(3) UL (ref 1.5–7.7)
NEUTROPHILS NFR BLD AUTO: 69.3 %
PLATELET # BLD AUTO: 196 10(3)UL (ref 150–450)
RBC # BLD AUTO: 6 X10(6)UL (ref 3.8–5.3)
RH BLOOD TYPE: POSITIVE
RUBV IGG SER QL: POSITIVE
RUBV IGG SER-ACNC: 194.1 IU/ML (ref 10–?)
T PALLIDUM AB SER QL IA: NONREACTIVE
WBC # BLD AUTO: 7.9 X10(3) UL (ref 4–11)

## 2020-01-24 PROCEDURE — 87086 URINE CULTURE/COLONY COUNT: CPT

## 2020-01-24 PROCEDURE — 85025 COMPLETE CBC W/AUTO DIFF WBC: CPT

## 2020-01-24 PROCEDURE — 86780 TREPONEMA PALLIDUM: CPT

## 2020-01-24 PROCEDURE — 36415 COLL VENOUS BLD VENIPUNCTURE: CPT

## 2020-01-24 PROCEDURE — 87389 HIV-1 AG W/HIV-1&-2 AB AG IA: CPT

## 2020-01-24 PROCEDURE — 86762 RUBELLA ANTIBODY: CPT

## 2020-01-24 PROCEDURE — 87340 HEPATITIS B SURFACE AG IA: CPT

## 2020-01-24 PROCEDURE — 86901 BLOOD TYPING SEROLOGIC RH(D): CPT

## 2020-01-24 PROCEDURE — 86850 RBC ANTIBODY SCREEN: CPT

## 2020-01-24 PROCEDURE — 86900 BLOOD TYPING SEROLOGIC ABO: CPT

## 2020-02-27 PROBLEM — O34.219 PREVIOUS CESAREAN DELIVERY AFFECTING PREGNANCY: Status: ACTIVE | Noted: 2020-02-27

## 2020-02-27 PROBLEM — O34.219 PREVIOUS CESAREAN DELIVERY AFFECTING PREGNANCY (HCC): Status: ACTIVE | Noted: 2020-02-27

## 2020-03-04 PROBLEM — Z34.90 PREGNANT: Status: RESOLVED | Noted: 2017-07-07 | Resolved: 2020-03-04

## 2020-03-04 PROBLEM — Z34.90 PREGNANT (HCC): Status: RESOLVED | Noted: 2017-07-07 | Resolved: 2020-03-04

## 2020-03-04 PROBLEM — Z98.891 STATUS POST CESAREAN SECTION: Status: RESOLVED | Noted: 2017-07-11 | Resolved: 2020-03-04

## 2020-03-13 ENCOUNTER — APPOINTMENT (OUTPATIENT)
Dept: LAB | Facility: HOSPITAL | Age: 35
End: 2020-03-13
Attending: OBSTETRICS & GYNECOLOGY
Payer: COMMERCIAL

## 2020-03-13 DIAGNOSIS — Z36.9 ENCOUNTER FOR ANTENATAL SCREENING OF MOTHER: ICD-10-CM

## 2020-03-13 PROCEDURE — 81511 FTL CGEN ABNOR FOUR ANAL: CPT

## 2020-03-17 LAB
AFP SMOKING: NO
FAMILY HISTORY OF ANEUPLOIDY: NO
FAMILY HX NEURAL TUBE DEFECT: NO
INSULIN REQ MATERNAL DIABETES: NO
MATERNAL AGE OF DELIVERY: 34.3 YR
MOM FOR AFP: 1.15
MOM FOR DIA: 1.08
MOM FOR HCG: 1.66
MOM FOR UE3: 1.04
PATIENT'S AFP: 39 NG/ML
PATIENT'S DIA: 172 PG/ML
PATIENT'S HCG: NORMAL IU/L
PATIENT'S UE3: 1.17 NG/ML

## 2020-04-15 ENCOUNTER — HOSPITAL ENCOUNTER (OUTPATIENT)
Dept: ULTRASOUND IMAGING | Facility: HOSPITAL | Age: 35
Discharge: HOME | End: 2020-04-15
Attending: OBSTETRICS & GYNECOLOGY
Payer: COMMERCIAL

## 2020-04-15 DIAGNOSIS — Z36.89 ENCOUNTER FOR FETAL ANATOMIC SURVEY: ICD-10-CM

## 2020-04-15 PROCEDURE — 76805 OB US >/= 14 WKS SNGL FETUS: CPT | Performed by: OBSTETRICS & GYNECOLOGY

## 2020-04-15 NOTE — PROGRESS NOTES
Normal 20 week U/S.  S=D.   No congenital anomalies identified  Please call patient and let her know of normal results

## 2020-05-26 ENCOUNTER — LAB ENCOUNTER (OUTPATIENT)
Dept: LAB | Facility: HOSPITAL | Age: 35
End: 2020-05-26
Attending: OBSTETRICS & GYNECOLOGY
Payer: COMMERCIAL

## 2020-05-26 DIAGNOSIS — Z36.9 ENCOUNTER FOR ANTENATAL SCREENING OF MOTHER: ICD-10-CM

## 2020-05-26 PROCEDURE — 36415 COLL VENOUS BLD VENIPUNCTURE: CPT

## 2020-05-26 PROCEDURE — 85018 HEMOGLOBIN: CPT

## 2020-05-26 PROCEDURE — 82950 GLUCOSE TEST: CPT

## 2020-05-26 PROCEDURE — 87389 HIV-1 AG W/HIV-1&-2 AB AG IA: CPT

## 2020-05-26 PROCEDURE — 85014 HEMATOCRIT: CPT

## 2020-05-26 PROCEDURE — 86780 TREPONEMA PALLIDUM: CPT

## 2020-05-27 NOTE — PROGRESS NOTES
Telephone Information:  Mobile          [phone]    Pt notified of results & verbalized understanding.

## 2020-07-28 PROBLEM — O99.820 GROUP B STREPTOCOCCUS CARRIER, +RV CULTURE, CURRENTLY PREGNANT (HCC): Status: ACTIVE | Noted: 2020-07-28

## 2020-07-28 PROBLEM — O99.820 GROUP B STREPTOCOCCUS CARRIER, +RV CULTURE, CURRENTLY PREGNANT: Status: ACTIVE | Noted: 2020-07-28

## 2020-08-14 ENCOUNTER — ANESTHESIA (OUTPATIENT)
Dept: OBGYN UNIT | Facility: HOSPITAL | Age: 35
End: 2020-08-14
Payer: COMMERCIAL

## 2020-08-14 ENCOUNTER — HOSPITAL ENCOUNTER (INPATIENT)
Facility: HOSPITAL | Age: 35
LOS: 3 days | Discharge: HOME | End: 2020-08-17
Attending: OBSTETRICS & GYNECOLOGY | Admitting: OBSTETRICS & GYNECOLOGY
Payer: COMMERCIAL

## 2020-08-14 ENCOUNTER — ANESTHESIA EVENT (OUTPATIENT)
Dept: OBGYN UNIT | Facility: HOSPITAL | Age: 35
End: 2020-08-14
Payer: COMMERCIAL

## 2020-08-14 PROBLEM — Z34.90 PREGNANCY: Status: ACTIVE | Noted: 2020-08-14

## 2020-08-14 PROBLEM — Z34.90 PREGNANCY (HCC): Status: ACTIVE | Noted: 2020-08-14

## 2020-08-14 LAB
ANTIBODY SCREEN: NEGATIVE
BASOPHILS # BLD AUTO: 0.03 X10(3) UL (ref 0–0.2)
BASOPHILS NFR BLD AUTO: 0.3 %
DEPRECATED RDW RBC AUTO: 36.7 FL (ref 35.1–46.3)
EOSINOPHIL # BLD AUTO: 0.11 X10(3) UL (ref 0–0.7)
EOSINOPHIL NFR BLD AUTO: 1.1 %
ERYTHROCYTE [DISTWIDTH] IN BLOOD BY AUTOMATED COUNT: 15.9 % (ref 11–15)
HCT VFR BLD AUTO: 40.4 % (ref 35–48)
HGB BLD-MCNC: 12.6 G/DL (ref 12–16)
IMM GRANULOCYTES # BLD AUTO: 0.05 X10(3) UL (ref 0–1)
IMM GRANULOCYTES NFR BLD: 0.5 %
LYMPHOCYTES # BLD AUTO: 1.68 X10(3) UL (ref 1–4)
LYMPHOCYTES NFR BLD AUTO: 17 %
MCH RBC QN AUTO: 21.2 PG (ref 26–34)
MCHC RBC AUTO-ENTMCNC: 31.2 G/DL (ref 31–37)
MCV RBC AUTO: 68 FL (ref 80–100)
MONOCYTES # BLD AUTO: 0.55 X10(3) UL (ref 0.1–1)
MONOCYTES NFR BLD AUTO: 5.6 %
NEUTROPHILS # BLD AUTO: 7.47 X10 (3) UL (ref 1.5–7.7)
NEUTROPHILS # BLD AUTO: 7.47 X10(3) UL (ref 1.5–7.7)
NEUTROPHILS NFR BLD AUTO: 75.5 %
PLATELET # BLD AUTO: 170 10(3)UL (ref 150–450)
RBC # BLD AUTO: 5.94 X10(6)UL (ref 3.8–5.3)
RH BLOOD TYPE: POSITIVE
SARS-COV-2 RNA RESP QL NAA+PROBE: NOT DETECTED
T PALLIDUM AB SER QL IA: NONREACTIVE
WBC # BLD AUTO: 9.9 X10(3) UL (ref 4–11)

## 2020-08-14 PROCEDURE — 85025 COMPLETE CBC W/AUTO DIFF WBC: CPT | Performed by: OBSTETRICS & GYNECOLOGY

## 2020-08-14 PROCEDURE — 86850 RBC ANTIBODY SCREEN: CPT | Performed by: OBSTETRICS & GYNECOLOGY

## 2020-08-14 PROCEDURE — 86901 BLOOD TYPING SEROLOGIC RH(D): CPT | Performed by: OBSTETRICS & GYNECOLOGY

## 2020-08-14 PROCEDURE — 86900 BLOOD TYPING SEROLOGIC ABO: CPT | Performed by: OBSTETRICS & GYNECOLOGY

## 2020-08-14 PROCEDURE — 99214 OFFICE O/P EST MOD 30 MIN: CPT

## 2020-08-14 PROCEDURE — 86780 TREPONEMA PALLIDUM: CPT | Performed by: OBSTETRICS & GYNECOLOGY

## 2020-08-14 RX ORDER — EPHEDRINE SULFATE/0.9% NACL/PF 25 MG/5 ML
5 SYRINGE (ML) INTRAVENOUS AS NEEDED
Status: DISCONTINUED | OUTPATIENT
Start: 2020-08-14 | End: 2020-08-15 | Stop reason: ALTCHOICE

## 2020-08-14 RX ORDER — ACETAMINOPHEN 500 MG
500 TABLET ORAL EVERY 6 HOURS PRN
Status: DISCONTINUED | OUTPATIENT
Start: 2020-08-14 | End: 2020-08-15 | Stop reason: HOSPADM

## 2020-08-14 RX ORDER — DIPHENHYDRAMINE HYDROCHLORIDE 50 MG/ML
12.5 INJECTION INTRAMUSCULAR; INTRAVENOUS EVERY 4 HOURS PRN
Status: DISCONTINUED | OUTPATIENT
Start: 2020-08-14 | End: 2020-08-14

## 2020-08-14 RX ORDER — IBUPROFEN 600 MG/1
600 TABLET ORAL EVERY 6 HOURS PRN
Status: DISCONTINUED | OUTPATIENT
Start: 2020-08-14 | End: 2020-08-15 | Stop reason: HOSPADM

## 2020-08-14 RX ORDER — DIPHENHYDRAMINE HYDROCHLORIDE 50 MG/ML
12.5 INJECTION INTRAMUSCULAR; INTRAVENOUS EVERY 4 HOURS PRN
Status: DISCONTINUED | OUTPATIENT
Start: 2020-08-14 | End: 2020-08-17

## 2020-08-14 RX ORDER — SODIUM CHLORIDE, SODIUM LACTATE, POTASSIUM CHLORIDE, CALCIUM CHLORIDE 600; 310; 30; 20 MG/100ML; MG/100ML; MG/100ML; MG/100ML
INJECTION, SOLUTION INTRAVENOUS CONTINUOUS
Status: DISCONTINUED | OUTPATIENT
Start: 2020-08-14 | End: 2020-08-15 | Stop reason: HOSPADM

## 2020-08-14 RX ORDER — TRISODIUM CITRATE DIHYDRATE AND CITRIC ACID MONOHYDRATE 500; 334 MG/5ML; MG/5ML
30 SOLUTION ORAL AS NEEDED
Status: DISCONTINUED | OUTPATIENT
Start: 2020-08-14 | End: 2020-08-15 | Stop reason: HOSPADM

## 2020-08-14 RX ORDER — ONDANSETRON 2 MG/ML
4 INJECTION INTRAMUSCULAR; INTRAVENOUS EVERY 6 HOURS PRN
Status: DISCONTINUED | OUTPATIENT
Start: 2020-08-14 | End: 2020-08-15 | Stop reason: HOSPADM

## 2020-08-14 RX ORDER — TERBUTALINE SULFATE 1 MG/ML
0.25 INJECTION, SOLUTION SUBCUTANEOUS AS NEEDED
Status: DISCONTINUED | OUTPATIENT
Start: 2020-08-14 | End: 2020-08-15 | Stop reason: HOSPADM

## 2020-08-14 RX ORDER — DEXTROSE, SODIUM CHLORIDE, SODIUM LACTATE, POTASSIUM CHLORIDE, AND CALCIUM CHLORIDE 5; .6; .31; .03; .02 G/100ML; G/100ML; G/100ML; G/100ML; G/100ML
INJECTION, SOLUTION INTRAVENOUS AS NEEDED
Status: DISCONTINUED | OUTPATIENT
Start: 2020-08-14 | End: 2020-08-15 | Stop reason: HOSPADM

## 2020-08-14 RX ORDER — CLINDAMYCIN PHOSPHATE 900 MG/50ML
900 INJECTION INTRAVENOUS EVERY 8 HOURS
Status: DISCONTINUED | OUTPATIENT
Start: 2020-08-14 | End: 2020-08-15 | Stop reason: HOSPADM

## 2020-08-14 RX ORDER — AMMONIA INHALANTS 0.04 G/.3ML
0.3 INHALANT RESPIRATORY (INHALATION) AS NEEDED
Status: DISCONTINUED | OUTPATIENT
Start: 2020-08-14 | End: 2020-08-15 | Stop reason: HOSPADM

## 2020-08-14 NOTE — H&P
[address] and Delivery Prenatal History and Physical Interval Addendum  Please see full Prenatal Record for this pregnancy      SUBJECTIVE:    Interval History: This is a pregnancy at 38 weeks admitted for SROM overnight.   Pt w/hx

## 2020-08-14 NOTE — PROGRESS NOTES
Patient reports UC getting stronger   good variability  SVE 3/80/-2  PreviousCS desires MICHELLE, SROM  16' ago, irregular UC  Offered LD Pit for augmentation, declines at this point re evaluate 1-2 hours.

## 2020-08-14 NOTE — PROGRESS NOTES
Pt to triage with c/o srom at [phone] last nite. Clear fluid seen, large amount.  Monitor applied to pt

## 2020-08-15 PROBLEM — O34.219 VAGINAL BIRTH AFTER CESAREAN: Status: ACTIVE | Noted: 2020-08-15

## 2020-08-15 PROBLEM — O34.219 VAGINAL BIRTH AFTER CESAREAN (HCC): Status: ACTIVE | Noted: 2020-08-15

## 2020-08-15 PROCEDURE — 0KQM0ZZ REPAIR PERINEUM MUSCLE, OPEN APPROACH: ICD-10-PCS | Performed by: OBSTETRICS & GYNECOLOGY

## 2020-08-15 RX ORDER — ACETAMINOPHEN 325 MG/1
650 TABLET ORAL EVERY 6 HOURS PRN
Status: DISCONTINUED | OUTPATIENT
Start: 2020-08-15 | End: 2020-08-17

## 2020-08-15 RX ORDER — SIMETHICONE 80 MG
80 TABLET,CHEWABLE ORAL 3 TIMES DAILY PRN
Status: DISCONTINUED | OUTPATIENT
Start: 2020-08-15 | End: 2020-08-17

## 2020-08-15 RX ORDER — BISACODYL 10 MG
10 SUPPOSITORY, RECTAL RECTAL ONCE AS NEEDED
Status: DISCONTINUED | OUTPATIENT
Start: 2020-08-15 | End: 2020-08-17

## 2020-08-15 RX ORDER — IBUPROFEN 600 MG/1
600 TABLET ORAL EVERY 6 HOURS
Status: DISCONTINUED | OUTPATIENT
Start: 2020-08-15 | End: 2020-08-17

## 2020-08-15 RX ORDER — DOCUSATE SODIUM 100 MG/1
100 CAPSULE, LIQUID FILLED ORAL
Status: DISCONTINUED | OUTPATIENT
Start: 2020-08-15 | End: 2020-08-17

## 2020-08-15 NOTE — PROGRESS NOTES
Patient comfortable with epidural   good variability  SVE per RN 8/100/-1  Progressing with LD pitocin  FWB reassuring

## 2020-08-15 NOTE — PROGRESS NOTES
[facility]  Post-Partum Vaginal Delivery Progress Note    Mitul Perez Patient Status:  Inpatient    1985 MRN [id number]   [facility] 2SW-J Attending Cameron Jonas MD   Hosp Day # 1 PCP Dre Chavez MD     SUBJECTIVE:    Martine Webb

## 2020-08-15 NOTE — ANESTHESIA PROCEDURE NOTES
Labor Analgesia  Performed by: Saqib Lopez MD  Authorized by: Saqib Lopez MD       General Information and Staff    Start Time:  8/14/2020 7:30 PM  End Time:  8/14/2020 7:39 PM  Anesthesiologist:  Saqib Lopez MD  Performed by:   Anesthesiologist  Kerry

## 2020-08-15 NOTE — ANESTHESIA PREPROCEDURE EVALUATION
PRE-OP EVALUATION    Patient Name: Mary Ann Beltran    Pre-op Diagnosis: * No pre-op diagnosis entered *    Procedure(s):      Surgeon(s) and Role:     * Judy Adam MD - Primary    Pre-op vitals reviewed.   Temp: 97.7 °F (36.5 °C)  Pulse: 96  Resp: 16  B Intravenous, Q4H PRN        Outpatient Medications:  Prenatal Vit-Fe Fumarate-FA (PRENATAL VITAMINS PLUS) 27-1 MG Oral Tab, Take 1 tablet by mouth daily. , Disp: , Rfl:         Allergies: Amoxicillin; Penicillins      Anesthesia Evaluation    Patient summar epidural, nausea/vomiting, pruritus, as well as other serious but rare complications including PPDH, infection, epidural hematoma, nerve/cord injury. Patient verbalizes understanding of expectations and risk. All questions were answered.      Plan/risks dis

## 2020-08-15 NOTE — PLAN OF CARE
NURSING ADMISSION NOTE      Patient admitted via [facility] to bed w/ 3 RNs. Rt leg weakness from epidural.   Oriented to room. Safety precautions initiated. Bed in low position.  Call light provoded w/ instructions to call  and wait for help at the breast  Description  INTERVENTIONS:  - Initiate breast feeding within first hour after birth. - Monitor effectiveness of current breast feeding efforts.   - Assess support systems available to mother/family.  - Identify cultural beliefs/practices r and coping mechanisms. - Encourage caregiver to participate in  daily care. - Assess support systems available to mother/family.  - Provide /case management support as needed.   Outcome: Progressing     Problem: POSTPARTUM  Goal: Exp

## 2020-08-15 NOTE — PLAN OF CARE
Problem: SAFETY ADULT - FALL  Goal: Free from fall injury  Description  INTERVENTIONS:  - Assess pt frequently for physical needs  - Identify cognitive and physical deficits and behaviors that affect risk of falls.   - [place] fall precautions as indica

## 2020-08-15 NOTE — PLAN OF CARE
Problem: SAFETY ADULT - FALL  Goal: Free from fall injury  Description  INTERVENTIONS:  - Assess pt frequently for physical needs  - Identify cognitive and physical deficits and behaviors that affect risk of falls.   - [place] fall precautions as indica previous experience with breast feeding.  - Provide information as needed about early infant feeding cues (e.g., rooting, lip smacking, sucking fingers/hand) versus late cue of crying.  - Discuss/demonstrate breast feeding aids (e.g., infant sling, nursing

## 2020-08-15 NOTE — PROGRESS NOTES
Patient up to bathroom with assist x 2. Unable to void at this time d/t RLE weakness. Patient straight cathed 200 mL. Patient transferred to mother/baby room 2214 per wheelchair in stable condition with baby and personal belongings.   Accompanied by ayesha

## 2020-08-16 LAB
BASOPHILS # BLD AUTO: 0.04 X10(3) UL (ref 0–0.2)
BASOPHILS NFR BLD AUTO: 0.5 %
DEPRECATED RDW RBC AUTO: 37.8 FL (ref 35.1–46.3)
EOSINOPHIL # BLD AUTO: 0.13 X10(3) UL (ref 0–0.7)
EOSINOPHIL NFR BLD AUTO: 1.6 %
ERYTHROCYTE [DISTWIDTH] IN BLOOD BY AUTOMATED COUNT: 15.3 % (ref 11–15)
HCT VFR BLD AUTO: 32.2 % (ref 35–48)
HGB BLD-MCNC: 9.7 G/DL (ref 12–16)
IMM GRANULOCYTES # BLD AUTO: 0.05 X10(3) UL (ref 0–1)
IMM GRANULOCYTES NFR BLD: 0.6 %
LYMPHOCYTES # BLD AUTO: 1.53 X10(3) UL (ref 1–4)
LYMPHOCYTES NFR BLD AUTO: 18.7 %
MCH RBC QN AUTO: 21 PG (ref 26–34)
MCHC RBC AUTO-ENTMCNC: 30.1 G/DL (ref 31–37)
MCV RBC AUTO: 69.5 FL (ref 80–100)
MONOCYTES # BLD AUTO: 0.5 X10(3) UL (ref 0.1–1)
MONOCYTES NFR BLD AUTO: 6.1 %
NEUTROPHILS # BLD AUTO: 5.94 X10 (3) UL (ref 1.5–7.7)
NEUTROPHILS # BLD AUTO: 5.94 X10(3) UL (ref 1.5–7.7)
NEUTROPHILS NFR BLD AUTO: 72.5 %
PLATELET # BLD AUTO: 145 10(3)UL (ref 150–450)
RBC # BLD AUTO: 4.63 X10(6)UL (ref 3.8–5.3)
WBC # BLD AUTO: 8.2 X10(3) UL (ref 4–11)

## 2020-08-16 PROCEDURE — 85025 COMPLETE CBC W/AUTO DIFF WBC: CPT | Performed by: OBSTETRICS & GYNECOLOGY

## 2020-08-16 NOTE — PROGRESS NOTES
Labor Analgesia Follow Up Note    Patient underwent epidural anesthesia for labor analgesia,    Placenta Date/Time: 8/15/2020 12:09 AM    Delivery Date/Time[de-identified] 8/15/2020  12:05 AM    /78 (BP Location: Right arm)   Pulse 83   Temp 98.7 °F (37.1 °C) (Or

## 2020-08-16 NOTE — PROGRESS NOTES
[facility]  Post-Partum Vaginal Delivery Progress Note    Sandra Palencia Patient Status:  Inpatient    1985 MRN [id number]   [facility] 2SW-J Attending Radha Huang MD   Hosp Day # 2 PCP Jesse Jin MD     SUBJECTIVE:    Geovanni Denson

## 2020-08-16 NOTE — PROGRESS NOTES
Pt called RN into room, pt states she woke up dizzy and SOB but both symptoms were very transient and have resolved. pt denies any sudden movt or positional changes. RN head to toe assessment WNL.  Lungs clear bilat on R.A, pt placed on cont, pulse ox,  o2 s

## 2020-08-17 VITALS
OXYGEN SATURATION: 99 % | BODY MASS INDEX: 31.24 KG/M2 | DIASTOLIC BLOOD PRESSURE: 75 MMHG | SYSTOLIC BLOOD PRESSURE: 111 MMHG | HEIGHT: 64 IN | HEART RATE: 85 BPM | WEIGHT: 183 LBS | TEMPERATURE: 98 F | RESPIRATION RATE: 18 BRPM

## 2020-08-17 PROBLEM — O34.219 VAGINAL BIRTH AFTER CESAREAN: Status: RESOLVED | Noted: 2020-08-15 | Resolved: 2020-08-15

## 2020-08-17 PROBLEM — O34.219 VAGINAL BIRTH AFTER CESAREAN (HCC): Status: RESOLVED | Noted: 2020-08-15 | Resolved: 2020-08-15

## 2020-08-17 NOTE — PROGRESS NOTES
Postpartum Day 2    Pt without complaints. Temp: 98.8 °F (37.1 °C)  Pulse: 77  Resp: 18  BP: 116/71  abd  soft, NT, ND, fundus firm below umbilicus  perineum NL lochia  extr  trace edema, no calf tenderness    Impression: PPD#2  Plan:  Home today.  [facility]

## 2020-08-21 ENCOUNTER — TELEPHONE (OUTPATIENT)
Dept: OBGYN UNIT | Facility: HOSPITAL | Age: 35
End: 2020-08-21

## 2020-08-21 NOTE — PROGRESS NOTES
Follow up cradle call attempted today. Message left on patient's phone to call physician's offices with any questions or concerns. Cradle call letter sent via \"My Chart\".

## 2021-05-07 ENCOUNTER — IMMUNIZATION (OUTPATIENT)
Dept: LAB | Facility: HOSPITAL | Age: 36
End: 2021-05-07
Attending: EMERGENCY MEDICINE
Payer: COMMERCIAL

## 2021-05-07 DIAGNOSIS — Z23 NEED FOR VACCINATION: Primary | ICD-10-CM

## 2021-05-07 PROCEDURE — 0001A SARSCOV2 VAC 30MCG/0.3ML IM: CPT

## 2021-05-29 ENCOUNTER — IMMUNIZATION (OUTPATIENT)
Dept: LAB | Facility: HOSPITAL | Age: 36
End: 2021-05-29
Attending: EMERGENCY MEDICINE
Payer: COMMERCIAL

## 2021-05-29 DIAGNOSIS — Z23 NEED FOR VACCINATION: Primary | ICD-10-CM

## 2021-05-29 PROCEDURE — 0002A SARSCOV2 VAC 30MCG/0.3ML IM: CPT

## 2021-09-27 ENCOUNTER — TELEMEDICINE (OUTPATIENT)
Dept: FAMILY MEDICINE CLINIC | Facility: CLINIC | Age: 36
End: 2021-09-27

## 2021-09-27 DIAGNOSIS — M54.50 ACUTE RIGHT-SIDED LOW BACK PAIN WITHOUT SCIATICA: Primary | ICD-10-CM

## 2021-09-27 PROCEDURE — 99213 OFFICE O/P EST LOW 20 MIN: CPT | Performed by: FAMILY MEDICINE

## 2021-09-27 NOTE — PROGRESS NOTES
Virtual Telephone Check-In    Yeny Wooten verbally consents to a Virtual/Telephone Check-In service on 09/27/21. Patient understands and accepts financial responsibility for any deductible, co-insurance and/or co-pays associated with this service.

## 2021-10-04 PROBLEM — O99.820 GROUP B STREPTOCOCCUS CARRIER, +RV CULTURE, CURRENTLY PREGNANT: Status: RESOLVED | Noted: 2020-07-28 | Resolved: 2021-10-04

## 2021-10-04 PROBLEM — Z34.90 PREGNANCY (HCC): Status: RESOLVED | Noted: 2020-08-14 | Resolved: 2021-10-04

## 2021-10-04 PROBLEM — O34.219 PREVIOUS CESAREAN DELIVERY AFFECTING PREGNANCY (HCC): Status: RESOLVED | Noted: 2020-02-27 | Resolved: 2021-10-04

## 2021-10-04 PROBLEM — O34.219 PREVIOUS CESAREAN DELIVERY AFFECTING PREGNANCY: Status: RESOLVED | Noted: 2020-02-27 | Resolved: 2021-10-04

## 2021-10-04 PROBLEM — O99.820 GROUP B STREPTOCOCCUS CARRIER, +RV CULTURE, CURRENTLY PREGNANT (HCC): Status: RESOLVED | Noted: 2020-07-28 | Resolved: 2021-10-04

## 2021-10-04 PROBLEM — Z34.90 PREGNANCY: Status: RESOLVED | Noted: 2020-08-14 | Resolved: 2021-10-04

## 2021-10-12 ENCOUNTER — ORDER TRANSCRIPTION (OUTPATIENT)
Dept: PHYSICAL THERAPY | Facility: HOSPITAL | Age: 36
End: 2021-10-12

## 2021-10-12 DIAGNOSIS — O09.521 MULTIGRAVIDA OF ADVANCED MATERNAL AGE IN FIRST TRIMESTER: Primary | ICD-10-CM

## 2021-10-22 ENCOUNTER — LAB ENCOUNTER (OUTPATIENT)
Dept: LAB | Age: 36
End: 2021-10-22
Attending: OBSTETRICS & GYNECOLOGY
Payer: COMMERCIAL

## 2021-10-22 DIAGNOSIS — Z36.9 ENCOUNTER FOR ANTENATAL SCREENING OF MOTHER: ICD-10-CM

## 2021-10-22 PROCEDURE — 87389 HIV-1 AG W/HIV-1&-2 AB AG IA: CPT

## 2021-10-22 PROCEDURE — 85025 COMPLETE CBC W/AUTO DIFF WBC: CPT

## 2021-10-22 PROCEDURE — 81508 FTL CGEN ABNOR TWO PROTEINS: CPT

## 2021-10-22 PROCEDURE — 86900 BLOOD TYPING SEROLOGIC ABO: CPT

## 2021-10-22 PROCEDURE — 86762 RUBELLA ANTIBODY: CPT

## 2021-10-22 PROCEDURE — 86780 TREPONEMA PALLIDUM: CPT

## 2021-10-22 PROCEDURE — 87340 HEPATITIS B SURFACE AG IA: CPT

## 2021-10-22 PROCEDURE — 86901 BLOOD TYPING SEROLOGIC RH(D): CPT

## 2021-10-22 PROCEDURE — 36415 COLL VENOUS BLD VENIPUNCTURE: CPT

## 2021-10-22 PROCEDURE — 86850 RBC ANTIBODY SCREEN: CPT

## 2021-11-03 PROBLEM — O09.529 AMA (ADVANCED MATERNAL AGE) MULTIGRAVIDA 35+ (HCC): Status: ACTIVE | Noted: 2021-11-03

## 2021-11-03 PROBLEM — Z98.891 HISTORY OF CESAREAN SECTION: Status: ACTIVE | Noted: 2021-11-03

## 2021-11-03 PROBLEM — O09.529 AMA (ADVANCED MATERNAL AGE) MULTIGRAVIDA 35+: Status: ACTIVE | Noted: 2021-11-03

## 2021-11-08 ENCOUNTER — APPOINTMENT (OUTPATIENT)
Dept: PHYSICAL THERAPY | Facility: HOSPITAL | Age: 36
End: 2021-11-08
Attending: FAMILY MEDICINE
Payer: COMMERCIAL

## 2021-11-15 ENCOUNTER — APPOINTMENT (OUTPATIENT)
Dept: PHYSICAL THERAPY | Facility: HOSPITAL | Age: 36
End: 2021-11-15
Attending: FAMILY MEDICINE
Payer: COMMERCIAL

## 2021-11-22 ENCOUNTER — APPOINTMENT (OUTPATIENT)
Dept: PHYSICAL THERAPY | Facility: HOSPITAL | Age: 36
End: 2021-11-22
Attending: FAMILY MEDICINE
Payer: COMMERCIAL

## 2021-11-29 ENCOUNTER — APPOINTMENT (OUTPATIENT)
Dept: PHYSICAL THERAPY | Facility: HOSPITAL | Age: 36
End: 2021-11-29
Attending: FAMILY MEDICINE
Payer: COMMERCIAL

## 2021-11-30 ENCOUNTER — LAB ENCOUNTER (OUTPATIENT)
Dept: LAB | Age: 36
End: 2021-11-30
Attending: OBSTETRICS & GYNECOLOGY
Payer: COMMERCIAL

## 2021-11-30 DIAGNOSIS — Z36.9 ENCOUNTER FOR ANTENATAL SCREENING OF MOTHER: ICD-10-CM

## 2021-11-30 PROCEDURE — 82105 ALPHA-FETOPROTEIN SERUM: CPT

## 2021-11-30 PROCEDURE — 36415 COLL VENOUS BLD VENIPUNCTURE: CPT

## 2021-12-06 ENCOUNTER — APPOINTMENT (OUTPATIENT)
Dept: PHYSICAL THERAPY | Facility: HOSPITAL | Age: 36
End: 2021-12-06
Attending: FAMILY MEDICINE
Payer: COMMERCIAL

## 2021-12-09 ENCOUNTER — IMMUNIZATION (OUTPATIENT)
Dept: LAB | Facility: HOSPITAL | Age: 36
End: 2021-12-09
Attending: EMERGENCY MEDICINE
Payer: COMMERCIAL

## 2021-12-09 DIAGNOSIS — Z23 NEED FOR VACCINATION: Primary | ICD-10-CM

## 2021-12-09 PROCEDURE — 0064A SARSCOV2 VAC 50MCG/0.25ML IM: CPT

## 2021-12-13 ENCOUNTER — APPOINTMENT (OUTPATIENT)
Dept: PHYSICAL THERAPY | Facility: HOSPITAL | Age: 36
End: 2021-12-13
Attending: FAMILY MEDICINE
Payer: COMMERCIAL

## 2021-12-20 ENCOUNTER — APPOINTMENT (OUTPATIENT)
Dept: PHYSICAL THERAPY | Facility: HOSPITAL | Age: 36
End: 2021-12-20
Attending: FAMILY MEDICINE
Payer: COMMERCIAL

## 2021-12-27 ENCOUNTER — NURSE TRIAGE (OUTPATIENT)
Dept: FAMILY MEDICINE CLINIC | Facility: CLINIC | Age: 36
End: 2021-12-27

## 2021-12-27 ENCOUNTER — APPOINTMENT (OUTPATIENT)
Dept: PHYSICAL THERAPY | Facility: HOSPITAL | Age: 36
End: 2021-12-27
Attending: FAMILY MEDICINE
Payer: COMMERCIAL

## 2021-12-27 NOTE — TELEPHONE ENCOUNTER
Action Requested: Summary for Provider     []  Critical Lab, Recommendations Needed  [] Need Additional Advice  []   FYI    []   Need Orders  [] Need Medications Sent to Pharmacy  []  Other     SUMMARY: Verified name and .   Patient states that she has n Disposition  • MODERATE rectal bleeding (small blood clots, passing blood without stool, or toilet water turns red)    Protocols used: RECTAL BLEEDING-A-[place]

## 2022-01-03 ENCOUNTER — OFFICE VISIT (OUTPATIENT)
Dept: PHYSICAL THERAPY | Age: 37
End: 2022-01-03
Attending: OBSTETRICS & GYNECOLOGY
Payer: COMMERCIAL

## 2022-01-03 ENCOUNTER — TELEPHONE (OUTPATIENT)
Dept: PHYSICAL THERAPY | Facility: HOSPITAL | Age: 37
End: 2022-01-03

## 2022-01-03 DIAGNOSIS — O09.521 MULTIGRAVIDA OF ADVANCED MATERNAL AGE IN FIRST TRIMESTER: ICD-10-CM

## 2022-01-03 PROCEDURE — 97161 PT EVAL LOW COMPLEX 20 MIN: CPT

## 2022-01-03 PROCEDURE — 97110 THERAPEUTIC EXERCISES: CPT

## 2022-01-03 NOTE — PROGRESS NOTES
PELVIC FLOOR EVALUATION:   Referring Physician: Dr. Skyler Kim  Diagnosis:  LBP, Multigravida of advanced maternal age in first trimester (O09.521)  Date of onset: chronic Date of Service: 1/3/2022     PATIENT SUMMARY   Allie Ann is a 29year old female sitting, lifting, carrying, lying on her sie. Signs and symptoms are consistent with diagnosis of LBP during pregnancy. Pt and PT discussed evaluation findings, pathology, POC and HEP.   Pt voiced understanding and performs HEP correctly without reported p treatment limitation: None  Rehab Potential:excellent      Patient/Family/Caregiver was advised of these findings, precautions, and treatment options and has agreed to actively participate in planning and for this course of care.     Thank you for your refe

## 2022-01-04 ENCOUNTER — OFFICE VISIT (OUTPATIENT)
Dept: FAMILY MEDICINE CLINIC | Facility: CLINIC | Age: 37
End: 2022-01-04
Payer: COMMERCIAL

## 2022-01-04 VITALS
DIASTOLIC BLOOD PRESSURE: 67 MMHG | HEIGHT: 65 IN | TEMPERATURE: 98 F | WEIGHT: 178 LBS | SYSTOLIC BLOOD PRESSURE: 106 MMHG | BODY MASS INDEX: 29.66 KG/M2 | HEART RATE: 93 BPM

## 2022-01-04 DIAGNOSIS — K64.4 EXTERNAL HEMORRHOID: Primary | ICD-10-CM

## 2022-01-04 PROCEDURE — 3078F DIAST BP <80 MM HG: CPT | Performed by: FAMILY MEDICINE

## 2022-01-04 PROCEDURE — 99213 OFFICE O/P EST LOW 20 MIN: CPT | Performed by: FAMILY MEDICINE

## 2022-01-04 PROCEDURE — 3008F BODY MASS INDEX DOCD: CPT | Performed by: FAMILY MEDICINE

## 2022-01-04 PROCEDURE — 3074F SYST BP LT 130 MM HG: CPT | Performed by: FAMILY MEDICINE

## 2022-01-04 NOTE — PATIENT INSTRUCTIONS
Hemorrhoids    Hemorrhoids are swollen and inflamed veins inside the rectum and near the anus. The rectum is the last several inches of the colon. The anus is the passage between the rectum and the outside of the body.    Causes  The veins can become sw These include products such as psyllium or methylcellulose. · Drink more water. Your healthcare provider may direct you to drink plenty of water. This can help keep stool soft. · Be more active.  Frequent exercise aids digestion and helps prevent constipa will also help relieve pain and swelling. · Take sitz baths. Taking a sitz bath means sitting in a few inches of warm bath water. Soaking for 10 minutes twice a day can provide relief from painful hemorrhoids. It can also help the area stay clean.   · Dev absorbs water, making stools soft and bulky. Be sure to drink plenty of water throughout the day. Drinking fruit juices, such as prune juice or apple juice, can also help prevent constipation.    Get more exercise  Regular exercise aids digestion and helps

## 2022-01-04 NOTE — PROGRESS NOTES
Patient presents with:  Anal / Rectal Problem: c/o rectal bleeding from 12/23-12/27    HPI:   Ben Maciel is a 29year old female who presents to clinic with complaints of rectal bleeding from 12/23- 27. Patient is currently 22 weeks pregnant.   Has no

## 2022-01-05 ENCOUNTER — OFFICE VISIT (OUTPATIENT)
Dept: PHYSICAL THERAPY | Age: 37
End: 2022-01-05
Attending: OBSTETRICS & GYNECOLOGY
Payer: COMMERCIAL

## 2022-01-05 DIAGNOSIS — O09.521 MULTIGRAVIDA OF ADVANCED MATERNAL AGE IN FIRST TRIMESTER: ICD-10-CM

## 2022-01-05 PROCEDURE — 97140 MANUAL THERAPY 1/> REGIONS: CPT

## 2022-01-05 NOTE — PROGRESS NOTES
Dx:  Multigravida of advanced maternal age in first trimester (O200.65    Insurance (Authorized # of Visits):  BCBS PPO 10 per POC          Authorizing Physician: Dr. Tan Salvage  Next MD visit: none scheduled  Fall Risk: standard         Precautions: n/a

## 2022-01-12 ENCOUNTER — OFFICE VISIT (OUTPATIENT)
Dept: PHYSICAL THERAPY | Age: 37
End: 2022-01-12
Attending: FAMILY MEDICINE
Payer: COMMERCIAL

## 2022-01-12 PROCEDURE — 97140 MANUAL THERAPY 1/> REGIONS: CPT

## 2022-01-12 NOTE — PROGRESS NOTES
Dx:  Multigravida of advanced maternal age in first trimester (O200.65    Insurance (Authorized # of Visits):  BCBS PPO 10 per POC          Authorizing Physician: Dr. China Wiggins Next MD visit: none scheduled  Fall Risk: standard         Precautions: n/a

## 2022-01-19 ENCOUNTER — OFFICE VISIT (OUTPATIENT)
Dept: PHYSICAL THERAPY | Age: 37
End: 2022-01-19
Attending: FAMILY MEDICINE
Payer: COMMERCIAL

## 2022-01-19 PROCEDURE — 97140 MANUAL THERAPY 1/> REGIONS: CPT

## 2022-01-19 NOTE — PROGRESS NOTES
Dx:  Multigravida of advanced maternal age in first trimester (O200.65    Insurance (Authorized # of Visits):  BCBS PPO 10 per POC          Authorizing Physician: Dr. Oralia Mcfarland Next MD visit: none scheduled  Fall Risk: standard         Precautions: n/a

## 2022-01-26 ENCOUNTER — OFFICE VISIT (OUTPATIENT)
Dept: PHYSICAL THERAPY | Age: 37
End: 2022-01-26
Attending: FAMILY MEDICINE
Payer: COMMERCIAL

## 2022-01-26 PROCEDURE — 97140 MANUAL THERAPY 1/> REGIONS: CPT

## 2022-02-09 ENCOUNTER — OFFICE VISIT (OUTPATIENT)
Dept: PHYSICAL THERAPY | Age: 37
End: 2022-02-09
Attending: FAMILY MEDICINE
Payer: COMMERCIAL

## 2022-02-09 PROCEDURE — 97140 MANUAL THERAPY 1/> REGIONS: CPT

## 2022-02-16 ENCOUNTER — OFFICE VISIT (OUTPATIENT)
Dept: PHYSICAL THERAPY | Age: 37
End: 2022-02-16
Attending: FAMILY MEDICINE
Payer: COMMERCIAL

## 2022-02-16 PROCEDURE — 97140 MANUAL THERAPY 1/> REGIONS: CPT

## 2022-02-19 ENCOUNTER — LAB ENCOUNTER (OUTPATIENT)
Dept: LAB | Facility: REFERENCE LAB | Age: 37
End: 2022-02-19
Attending: OBSTETRICS & GYNECOLOGY
Payer: COMMERCIAL

## 2022-02-19 DIAGNOSIS — Z36.9 ENCOUNTER FOR ANTENATAL SCREENING OF MOTHER: ICD-10-CM

## 2022-02-19 LAB
GLUCOSE 1H P GLC SERPL-MCNC: 161 MG/DL
HCT VFR BLD AUTO: 37.9 %
HGB BLD-MCNC: 11.7 G/DL

## 2022-02-19 PROCEDURE — 82950 GLUCOSE TEST: CPT

## 2022-02-19 PROCEDURE — 36415 COLL VENOUS BLD VENIPUNCTURE: CPT

## 2022-02-19 PROCEDURE — 87389 HIV-1 AG W/HIV-1&-2 AB AG IA: CPT

## 2022-02-19 PROCEDURE — 85014 HEMATOCRIT: CPT

## 2022-02-19 PROCEDURE — 86780 TREPONEMA PALLIDUM: CPT

## 2022-02-19 PROCEDURE — 85018 HEMOGLOBIN: CPT

## 2022-02-21 LAB — T PALLIDUM AB SER QL: NEGATIVE

## 2022-02-25 ENCOUNTER — LABORATORY ENCOUNTER (OUTPATIENT)
Dept: LAB | Age: 37
End: 2022-02-25
Attending: OBSTETRICS & GYNECOLOGY
Payer: COMMERCIAL

## 2022-02-25 DIAGNOSIS — O99.810 ABNORMAL GLUCOSE TOLERANCE AFFECTING PREGNANCY, ANTEPARTUM: ICD-10-CM

## 2022-02-25 LAB
GLUCOSE 1H P GLC SERPL-MCNC: 124 MG/DL
GLUCOSE 2H P GLC SERPL-MCNC: 167 MG/DL
GLUCOSE 3H P GLC SERPL-MCNC: 128 MG/DL (ref 70–140)
GLUCOSE P FAST SERPL-MCNC: 76 MG/DL

## 2022-02-25 PROCEDURE — 36415 COLL VENOUS BLD VENIPUNCTURE: CPT

## 2022-02-25 PROCEDURE — 82952 GTT-ADDED SAMPLES: CPT

## 2022-02-25 PROCEDURE — 82951 GLUCOSE TOLERANCE TEST (GTT): CPT

## 2022-05-05 ENCOUNTER — HOSPITAL ENCOUNTER (INPATIENT)
Facility: HOSPITAL | Age: 37
LOS: 3 days | Discharge: HOME | End: 2022-05-08
Attending: OBSTETRICS & GYNECOLOGY | Admitting: OBSTETRICS & GYNECOLOGY
Payer: COMMERCIAL

## 2022-05-05 ENCOUNTER — ANESTHESIA (OUTPATIENT)
Dept: OBGYN UNIT | Facility: HOSPITAL | Age: 37
End: 2022-05-05
Payer: COMMERCIAL

## 2022-05-05 ENCOUNTER — ANESTHESIA EVENT (OUTPATIENT)
Dept: OBGYN UNIT | Facility: HOSPITAL | Age: 37
End: 2022-05-05
Payer: COMMERCIAL

## 2022-05-05 LAB
ANTIBODY SCREEN: NEGATIVE
BASOPHILS # BLD AUTO: 0.04 X10(3) UL (ref 0–0.2)
BASOPHILS NFR BLD AUTO: 0.4 %
EOSINOPHIL # BLD AUTO: 0.07 X10(3) UL (ref 0–0.7)
EOSINOPHIL NFR BLD AUTO: 0.7 %
ERYTHROCYTE [DISTWIDTH] IN BLOOD BY AUTOMATED COUNT: 16.7 %
HCT VFR BLD AUTO: 39.5 %
HGB BLD-MCNC: 12.2 G/DL
IMM GRANULOCYTES # BLD AUTO: 0.07 X10(3) UL (ref 0–1)
IMM GRANULOCYTES NFR BLD: 0.7 %
LYMPHOCYTES # BLD AUTO: 1.5 X10(3) UL (ref 1–4)
LYMPHOCYTES NFR BLD AUTO: 14 %
MCH RBC QN AUTO: 20.9 PG (ref 26–34)
MCHC RBC AUTO-ENTMCNC: 30.9 G/DL (ref 31–37)
MCV RBC AUTO: 67.5 FL
MONOCYTES # BLD AUTO: 0.54 X10(3) UL (ref 0.1–1)
MONOCYTES NFR BLD AUTO: 5 %
NEUTROPHILS # BLD AUTO: 8.5 X10 (3) UL (ref 1.5–7.7)
NEUTROPHILS # BLD AUTO: 8.5 X10(3) UL (ref 1.5–7.7)
NEUTROPHILS NFR BLD AUTO: 79.2 %
PLATELET # BLD AUTO: 155 10(3)UL (ref 150–450)
RBC # BLD AUTO: 5.85 X10(6)UL
RH BLOOD TYPE: POSITIVE
SARS-COV-2 RNA RESP QL NAA+PROBE: NOT DETECTED
T PALLIDUM AB SER QL IA: NONREACTIVE
WBC # BLD AUTO: 10.7 X10(3) UL (ref 4–11)

## 2022-05-05 PROCEDURE — 86900 BLOOD TYPING SEROLOGIC ABO: CPT | Performed by: OBSTETRICS & GYNECOLOGY

## 2022-05-05 PROCEDURE — 86901 BLOOD TYPING SEROLOGIC RH(D): CPT | Performed by: OBSTETRICS & GYNECOLOGY

## 2022-05-05 PROCEDURE — 99214 OFFICE O/P EST MOD 30 MIN: CPT

## 2022-05-05 PROCEDURE — 86780 TREPONEMA PALLIDUM: CPT | Performed by: OBSTETRICS & GYNECOLOGY

## 2022-05-05 PROCEDURE — 86850 RBC ANTIBODY SCREEN: CPT | Performed by: OBSTETRICS & GYNECOLOGY

## 2022-05-05 PROCEDURE — 85025 COMPLETE CBC W/AUTO DIFF WBC: CPT | Performed by: OBSTETRICS & GYNECOLOGY

## 2022-05-05 RX ORDER — SODIUM CHLORIDE, SODIUM LACTATE, POTASSIUM CHLORIDE, CALCIUM CHLORIDE 600; 310; 30; 20 MG/100ML; MG/100ML; MG/100ML; MG/100ML
INJECTION, SOLUTION INTRAVENOUS CONTINUOUS
Status: DISCONTINUED | OUTPATIENT
Start: 2022-05-05 | End: 2022-05-06 | Stop reason: HOSPADM

## 2022-05-05 RX ORDER — BUPIVACAINE HCL/0.9 % NACL/PF 0.25 %
5 PLASTIC BAG, INJECTION (ML) EPIDURAL AS NEEDED
Status: DISCONTINUED | OUTPATIENT
Start: 2022-05-05 | End: 2022-05-06

## 2022-05-05 RX ORDER — DEXTROSE, SODIUM CHLORIDE, SODIUM LACTATE, POTASSIUM CHLORIDE, AND CALCIUM CHLORIDE 5; .6; .31; .03; .02 G/100ML; G/100ML; G/100ML; G/100ML; G/100ML
INJECTION, SOLUTION INTRAVENOUS CONTINUOUS
Status: DISCONTINUED | OUTPATIENT
Start: 2022-05-05 | End: 2022-05-06 | Stop reason: HOSPADM

## 2022-05-05 RX ORDER — IBUPROFEN 600 MG/1
600 TABLET ORAL EVERY 6 HOURS PRN
Status: DISCONTINUED | OUTPATIENT
Start: 2022-05-05 | End: 2022-05-06 | Stop reason: HOSPADM

## 2022-05-05 RX ORDER — ACETAMINOPHEN 500 MG
500 TABLET ORAL EVERY 6 HOURS PRN
Status: DISCONTINUED | OUTPATIENT
Start: 2022-05-05 | End: 2022-05-06 | Stop reason: HOSPADM

## 2022-05-05 RX ORDER — ONDANSETRON 2 MG/ML
4 INJECTION INTRAMUSCULAR; INTRAVENOUS EVERY 6 HOURS PRN
Status: DISCONTINUED | OUTPATIENT
Start: 2022-05-05 | End: 2022-05-06 | Stop reason: HOSPADM

## 2022-05-05 RX ORDER — AMMONIA INHALANTS 0.04 G/.3ML
0.3 INHALANT RESPIRATORY (INHALATION) AS NEEDED
Status: DISCONTINUED | OUTPATIENT
Start: 2022-05-05 | End: 2022-05-06 | Stop reason: HOSPADM

## 2022-05-05 RX ORDER — NALBUPHINE HCL 10 MG/ML
2.5 AMPUL (ML) INJECTION
Status: DISCONTINUED | OUTPATIENT
Start: 2022-05-05 | End: 2022-05-06

## 2022-05-05 RX ORDER — TRISODIUM CITRATE DIHYDRATE AND CITRIC ACID MONOHYDRATE 500; 334 MG/5ML; MG/5ML
30 SOLUTION ORAL AS NEEDED
Status: DISCONTINUED | OUTPATIENT
Start: 2022-05-05 | End: 2022-05-06 | Stop reason: HOSPADM

## 2022-05-05 RX ORDER — TERBUTALINE SULFATE 1 MG/ML
0.25 INJECTION, SOLUTION SUBCUTANEOUS AS NEEDED
Status: DISCONTINUED | OUTPATIENT
Start: 2022-05-05 | End: 2022-05-06 | Stop reason: HOSPADM

## 2022-05-05 RX ORDER — CLINDAMYCIN PHOSPHATE 900 MG/50ML
900 INJECTION INTRAVENOUS EVERY 8 HOURS
Status: DISCONTINUED | OUTPATIENT
Start: 2022-05-05 | End: 2022-05-06

## 2022-05-05 RX ORDER — VANCOMYCIN/0.9 % SOD CHLORIDE 1.75 G/5
20 PLASTIC BAG, INJECTION (ML) INTRAVENOUS EVERY 8 HOURS
Status: DISCONTINUED | OUTPATIENT
Start: 2022-05-05 | End: 2022-05-05

## 2022-05-05 NOTE — H&P
[address] and Delivery Prenatal History and Physical Interval Addendum  Please see full Prenatal Record for this pregnancy      SUBJECTIVE:    Interval History: This is a pregnancy at 37+ weeks admitted for SROM clear fluid 0700 today. Mild contractions since arrival. No other c/o. OBJECTIVE:    The patient is comfortable    Fetal Surveillance:  Fetal heart variability: moderate, accels noted    Cervix:  Per RN in triage, 1.5/60/-2 ceph  +ROM      ASSESSMENT/PLAN:    37+ w, SROM. Starting to have mild contractions  Gbs status: pos, susceptible to clinda  Problems pertinent to admission: prior  followed by successful VTOL. Desires MICHELLE again.

## 2022-05-05 NOTE — PROGRESS NOTES
Pt is a 29year old female admitted to TRG2/TRG2-A. Patient presents with:  R/o Rom: pt say she has a gush fluid @ 0700 am     Pt is  37w6d intra-uterine pregnancy. History obtained, consents signed. Oriented to room, staff, and plan of care.   Grossly ruptured for clear fluid ,  Nitrazine positive  Ferning positive   no vaginal bleeding , contraction irregular and not painful  Updated orders received from [address] [place] [address] admit pt in labor IV hydration, clindamycin for GBS and report given to [facility] and pt transferred to [facility]

## 2022-05-06 PROCEDURE — 0HQ9XZZ REPAIR PERINEUM SKIN, EXTERNAL APPROACH: ICD-10-PCS | Performed by: OBSTETRICS & GYNECOLOGY

## 2022-05-06 PROCEDURE — 10907ZC DRAINAGE OF AMNIOTIC FLUID, THERAPEUTIC FROM PRODUCTS OF CONCEPTION, VIA NATURAL OR ARTIFICIAL OPENING: ICD-10-PCS | Performed by: OBSTETRICS & GYNECOLOGY

## 2022-05-06 RX ORDER — DOCUSATE SODIUM 100 MG/1
100 CAPSULE, LIQUID FILLED ORAL
Status: DISCONTINUED | OUTPATIENT
Start: 2022-05-06 | End: 2022-05-08

## 2022-05-06 RX ORDER — BISACODYL 10 MG
10 SUPPOSITORY, RECTAL RECTAL ONCE AS NEEDED
Status: DISCONTINUED | OUTPATIENT
Start: 2022-05-06 | End: 2022-05-08

## 2022-05-06 RX ORDER — SIMETHICONE 80 MG
80 TABLET,CHEWABLE ORAL 3 TIMES DAILY PRN
Status: DISCONTINUED | OUTPATIENT
Start: 2022-05-06 | End: 2022-05-08

## 2022-05-06 RX ORDER — PHENYLEPHRINE HCL 10 MG/ML
VIAL (ML) INJECTION AS NEEDED
Status: DISCONTINUED | OUTPATIENT
Start: 2022-05-06 | End: 2022-05-06 | Stop reason: SURG

## 2022-05-06 RX ORDER — ACETAMINOPHEN 500 MG
500 TABLET ORAL EVERY 6 HOURS PRN
Status: DISCONTINUED | OUTPATIENT
Start: 2022-05-06 | End: 2022-05-08

## 2022-05-06 RX ORDER — ACETAMINOPHEN 500 MG
1000 TABLET ORAL EVERY 6 HOURS PRN
Status: DISCONTINUED | OUTPATIENT
Start: 2022-05-06 | End: 2022-05-08

## 2022-05-06 RX ORDER — IBUPROFEN 600 MG/1
600 TABLET ORAL EVERY 6 HOURS
Status: DISCONTINUED | OUTPATIENT
Start: 2022-05-06 | End: 2022-05-08

## 2022-05-06 RX ADMIN — PHENYLEPHRINE HCL 100 MCG: 10 MG/ML VIAL (ML) INJECTION at 00:25:00

## 2022-05-06 NOTE — PROGRESS NOTES
Minimal change on last cervical exam  Pt stated contractions a little further apart  Low-dose oxytocin initiated, discussed w/pt

## 2022-05-06 NOTE — PLAN OF CARE
Problem: SAFETY ADULT - FALL  Goal: Free from fall injury  Description: INTERVENTIONS:  - Assess pt frequently for physical needs  - Identify cognitive and physical deficits and behaviors that affect risk of falls.   - [place] fall precautions as indicated by assessment.  - Educate pt/family on patient safety including physical limitations  - Instruct pt to call for assistance with activity based on assessment  - Modify environment to reduce risk of injury  - Provide assistive devices as appropriate  - Consider OT/PT consult to assist with strengthening/mobility  - Encourage toileting schedule  Outcome: Progressing

## 2022-05-06 NOTE — L&D DELIVERY NOTE
Term SROM, MICHELLE, dilute oxytocin used. This patient was admitted to the hospital and proceeded to have a vaginal birth. A 7 pound 5 ounce female infant was delivered. Head delivery was controlled. Fetal body delivered without difficulty. Nares and mouth were bulb suctioned. There was no cord entanglement  Infant was placed on the maternal abdomen. Umbilical cord was cut by the patient after usual delay. Placenta delivered spontaneously, intact, with a 3 vessel cord and normal appearance. RUBEN gently examined and intact. Lacerations included: first degree perineal  Repair was performed using Rapide suture. Cervix and upper vault were intact. EBL: 250 ml  Anesthesia: epidural  Mother and  in good condition.

## 2022-05-06 NOTE — PLAN OF CARE
Problem: BIRTH - VAGINAL/ SECTION  Goal: Fetal and maternal status remain reassuring during the birth process  Description: INTERVENTIONS:  - Monitor vital signs  - Monitor fetal heart rate  - Monitor uterine activity  - Monitor labor progression (vaginal delivery)  - DVT prophylaxis (C/S delivery)  - Surgical antibiotic prophylaxis (C/S delivery)  Outcome: Progressing     Problem: PAIN - ADULT  Goal: Verbalizes/displays adequate comfort level or patient's stated pain goal  Description: INTERVENTIONS:  - Encourage pt to monitor pain and request assistance  - Assess pain using appropriate pain scale  - Administer analgesics based on type and severity of pain and evaluate response  - Implement non-pharmacological measures as appropriate and evaluate response  - Consider cultural and social influences on pain and pain management  - Manage/alleviate anxiety  - Utilize distraction and/or relaxation techniques  - Monitor for opioid side effects  - Notify MD/LIP if interventions unsuccessful or patient reports new pain  - Anticipate increased pain with activity and pre-medicate as appropriate  Outcome: Progressing     Problem: ANXIETY  Goal: Will report anxiety at manageable levels  Description: INTERVENTIONS:  - Administer medication as ordered  - Teach and rehearse alternative coping skills  - Provide emotional support with 1:1 interaction with staff  Outcome: Progressing     Problem: Patient/Family Goals  Goal: Patient/Family Long Term Goal  Description: Patient's Long Term Goal: uncomplicated delivery    Interventions:  - See additional Care Plan goals for specific interventions  Outcome: Progressing  Goal: Patient/Family Short Term Goal  Description: Patient's Short Term Goal: adequate pain control    Interventions:   - See additional Care Plan goals for specific interventions  Outcome: Progressing

## 2022-05-06 NOTE — PLAN OF CARE
Problem: SAFETY ADULT - FALL  Goal: Free from fall injury  Description: INTERVENTIONS:  - Assess pt frequently for physical needs  - Identify cognitive and physical deficits and behaviors that affect risk of falls.   - [place] fall precautions as indicated by assessment.  - Educate pt/family on patient safety including physical limitations  - Instruct pt to call for assistance with activity based on assessment  - Modify environment to reduce risk of injury  - Provide assistive devices as appropriate  - Consider OT/PT consult to assist with strengthening/mobility  - Encourage toileting schedule  Outcome: Progressing

## 2022-05-06 NOTE — PROGRESS NOTES
Patient up to bathroom with assist x1. Voided  at this time. Patient transferred to mother/baby room 111/111-A  per wheelchair in stable condition with baby and personal belongings. Accompanied by staff. Report given to mother/baby RN.

## 2022-05-06 NOTE — PLAN OF CARE
Problem: POSTPARTUM  Goal: Long Term Goal:Experiences normal postpartum course  Description: INTERVENTIONS:  - Assess and monitor vital signs and lab values. - Assess fundus and lochia. - Provide ice/sitz baths for perineum discomfort. - Monitor healing of incision/episiotomy/laceration, and assess for signs and symptoms of infection and hematoma. - Assess bladder function and monitor for bladder distention.  - Provide/instruct/assist with pericare as needed. - Provide VTE prophylaxis as needed. - Monitor bowel function.  - Encourage ambulation and provide assistance as needed. - Assess and monitor emotional status and provide social service/psych resources as needed. - Utilize standard precautions and use personal protective equipment as indicated. Ensure aseptic care of all intravenous lines and invasive tubes/drains.  - Obtain immunization and exposure to communicable diseases history. Outcome: Progressing  Goal: Optimize infant feeding at the breast  Description: INTERVENTIONS:  - Initiate breast feeding within first hour after birth. - Monitor effectiveness of current breast feeding efforts. - Assess support systems available to mother/family.  - Identify cultural beliefs/practices regarding lactation, letdown techniques, maternal food preferences. - Assess mother's knowledge and previous experience with breast feeding.  - Provide information as needed about early infant feeding cues (e.g., rooting, lip smacking, sucking fingers/hand) versus late cue of crying.  - Discuss/demonstrate breast feeding aids (e.g., infant sling, nursing footstool/pillows, and breast pumps). - Encourage mother/other family members to express feelings/concerns, and actively listen. - Educate father/SO about benefits of breast feeding and how to manage common lactation challenges.   - Recommend avoidance of specific medications or substances incompatible with breast feeding.  - Assess and monitor for signs of nipple pain/trauma. - Instruct and provide assistance with proper latch. - Review techniques for milk expression (breast pumping) and storage of breast milk. Provide pumping equipment/supplies, instructions and assistance, as needed. - Encourage rooming-in and breast feeding on demand.  - Encourage skin-to-skin contact. - Provide LC support as needed. - Assess for and manage engorgement. - Provide breast feeding education handouts and information on community breast feeding support. Outcome: Progressing  Goal: Establishment of adequate milk supply with medication/procedure interruptions  Description: INTERVENTIONS:  - Review techniques for milk expression (breast pumping). - Provide pumping equipment/supplies, instructions, and assistance until it is safe to breastfeed infant. Outcome: Progressing  Goal: Appropriate maternal -  bonding  Description: INTERVENTIONS:  - Assess caregiver- interactions. - Assess caregiver's emotional status and coping mechanisms. - Encourage caregiver to participate in  daily care. - Assess support systems available to mother/family.  - Provide /case management support as needed. Outcome: Progressing     Problem: SAFETY ADULT - FALL  Goal: Free from fall injury  Description: INTERVENTIONS:  - Assess pt frequently for physical needs  - Identify cognitive and physical deficits and behaviors that affect risk of falls.   - [place] fall precautions as indicated by assessment.  - Educate pt/family on patient safety including physical limitations  - Instruct pt to call for assistance with activity based on assessment  - Modify environment to reduce risk of injury  - Provide assistive devices as appropriate  - Consider OT/PT consult to assist with strengthening/mobility  - Encourage toileting schedule  Outcome: Completed     Problem: POSTPARTUM  Goal: Experiences normal breast weaning course  Description: INTERVENTIONS:  - Assess for and manage engorgement. - Instruct on breast care. - Provide comfort measures.   Outcome: Completed

## 2022-05-06 NOTE — ANESTHESIA PROCEDURE NOTES
Labor Analgesia  Performed by: Betzaida Jenkins MD  Authorized by: Betzaida Jenkins MD       General Information and Staff    Start Time:  5/5/2022 10:46 PM  End Time:  5/5/2022 10:57 PM  Anesthesiologist:  Betzaida Jenkins MD  Performed by: Anesthesiologist  Patient Location:  OB  Site Identification: surface landmarks    Reason for Block: labor epidural    Preanesthetic Checklist: patient identified, IV checked, risks and benefits discussed, monitors and equipment checked, pre-op evaluation, timeout performed, IV bolus, anesthesia consent and sterile technique used      Procedure Details    Patient Position:  Sitting  Prep: ChloraPrep    Monitoring:  Heart rate and continuous pulse ox  Approach:  Midline    Epidural Needle    Injection Technique:  ANCA air  Needle Type:  Tuohy  Needle Gauge:  17 G  Needle Length:  3.375 in  Needle Insertion Depth:  6  Location:  L3-4    Spinal Needle      Catheter    Catheter Type:  End hole  Catheter Size:  19 G  Catheter at Skin Depth:  11  Test Dose:  Negative    Assessment  Sensory Level:  T10    Additional Comments        Medications:  Bupivacaine 0.25% 6ccs local infiltration to skin for placement. Lidocaine 1% from prepackaged kit, administered epidural through tuohy 5ccs  Test dose from prepackaged kit Lidocaine 1.5% epinephrine 1:200,000  3ccs   Fentanyl 100micrograms given epidural through catheter. Infusion then initiated per [facility] standard solution PCEA 12 ccs/hr. Note, ICU Medical pump used, but continuous occlusion alarm repeated without resolution. Infusion switched to CADD pump.

## 2022-05-06 NOTE — PROGRESS NOTES
One hr ago per RN 8/-2 to -1  Now 8/0, arom of forebag  Contractions a bit further apart.  Oxytocin to 8 mu/min

## 2022-05-06 NOTE — PROGRESS NOTES
Pt admitted to room 2215 in stable condition. ID bands matched x2, verified with Dandy Chacon and Genevieve uPri RN. Educational materials reviewed with Pt. All questions answered.

## 2022-05-07 LAB
BASOPHILS # BLD AUTO: 0.04 X10(3) UL (ref 0–0.2)
BASOPHILS NFR BLD AUTO: 0.3 %
EOSINOPHIL # BLD AUTO: 0.13 X10(3) UL (ref 0–0.7)
EOSINOPHIL NFR BLD AUTO: 1.1 %
ERYTHROCYTE [DISTWIDTH] IN BLOOD BY AUTOMATED COUNT: 15.7 %
HCT VFR BLD AUTO: 33.6 %
HGB BLD-MCNC: 10.7 G/DL
IMM GRANULOCYTES # BLD AUTO: 0.07 X10(3) UL (ref 0–1)
IMM GRANULOCYTES NFR BLD: 0.6 %
LYMPHOCYTES # BLD AUTO: 1.45 X10(3) UL (ref 1–4)
LYMPHOCYTES NFR BLD AUTO: 12.4 %
MCH RBC QN AUTO: 21.3 PG (ref 26–34)
MCHC RBC AUTO-ENTMCNC: 31.8 G/DL (ref 31–37)
MCV RBC AUTO: 66.9 FL
MONOCYTES # BLD AUTO: 0.67 X10(3) UL (ref 0.1–1)
MONOCYTES NFR BLD AUTO: 5.7 %
NEUTROPHILS # BLD AUTO: 9.36 X10 (3) UL (ref 1.5–7.7)
NEUTROPHILS # BLD AUTO: 9.36 X10(3) UL (ref 1.5–7.7)
NEUTROPHILS NFR BLD AUTO: 79.9 %
PLATELET # BLD AUTO: 135 10(3)UL (ref 150–450)
RBC # BLD AUTO: 5.02 X10(6)UL
WBC # BLD AUTO: 11.7 X10(3) UL (ref 4–11)

## 2022-05-07 PROCEDURE — 85025 COMPLETE CBC W/AUTO DIFF WBC: CPT | Performed by: OBSTETRICS & GYNECOLOGY

## 2022-05-07 NOTE — PROGRESS NOTES
Patient complaints: none. Pain being managed. Vital signs reviewed    Examination:  General: alert, appropriate affect  Abdomen: Fundus firm and no unexpected tenderness. Remainder of abdomen unremarkable  Lochia: appropriate  Extremities: nontender, no excessive edema, symmetric. Mild bilateral LE edema    Recent Labs   Lab 22  1219 22  0745   RBC 5.85* 5.02   HGB 12.2 10.7*   HCT 39.5 33.6*   MCV 67.5* 66.9*   MCH 20.9* 21.3*   MCHC 30.9* 31.8   RDW 16.7 15.7   NEPRELIM 8.50* 9.36*   WBC 10.7 11.7*   .0 135.0*          Impression:   Postpartum day #1 from vaginal birth, recuperating appropriately, anticipate routine discharge.    Home tomorrow

## 2022-05-08 VITALS
HEART RATE: 78 BPM | DIASTOLIC BLOOD PRESSURE: 83 MMHG | BODY MASS INDEX: 29.66 KG/M2 | WEIGHT: 178 LBS | SYSTOLIC BLOOD PRESSURE: 125 MMHG | RESPIRATION RATE: 18 BRPM | TEMPERATURE: 98 F | HEIGHT: 65 IN

## 2022-05-08 NOTE — PLAN OF CARE

## 2022-05-12 ENCOUNTER — TELEPHONE (OUTPATIENT)
Dept: OBGYN UNIT | Facility: HOSPITAL | Age: 37
End: 2022-05-12

## 2022-06-11 ENCOUNTER — HOSPITAL ENCOUNTER (OUTPATIENT)
Age: 37
Discharge: HOME | End: 2022-06-11
Attending: EMERGENCY MEDICINE
Payer: COMMERCIAL

## 2022-06-11 VITALS
SYSTOLIC BLOOD PRESSURE: 113 MMHG | HEART RATE: 104 BPM | OXYGEN SATURATION: 97 % | RESPIRATION RATE: 18 BRPM | TEMPERATURE: 99 F | DIASTOLIC BLOOD PRESSURE: 72 MMHG

## 2022-06-11 DIAGNOSIS — N61.0 MASTITIS: Primary | ICD-10-CM

## 2022-06-11 PROCEDURE — 99213 OFFICE O/P EST LOW 20 MIN: CPT

## 2022-06-11 RX ORDER — CLINDAMYCIN HYDROCHLORIDE 300 MG/1
300 CAPSULE ORAL 4 TIMES DAILY
Qty: 40 CAPSULE | Refills: 0 | Status: SHIPPED | OUTPATIENT
Start: 2022-06-11 | End: 2022-06-21

## 2022-06-24 ENCOUNTER — LAB ENCOUNTER (OUTPATIENT)
Dept: LAB | Age: 37
End: 2022-06-24
Attending: OBSTETRICS & GYNECOLOGY
Payer: COMMERCIAL

## 2022-06-24 DIAGNOSIS — D50.8 IRON DEFICIENCY ANEMIA SECONDARY TO INADEQUATE DIETARY IRON INTAKE: Primary | ICD-10-CM

## 2022-06-24 LAB
BASOPHILS # BLD AUTO: 0.05 X10(3) UL (ref 0–0.2)
BASOPHILS NFR BLD AUTO: 0.6 %
DEPRECATED RDW RBC AUTO: 37.3 FL (ref 35.1–46.3)
EOSINOPHIL # BLD AUTO: 0.33 X10(3) UL (ref 0–0.7)
EOSINOPHIL NFR BLD AUTO: 4.3 %
ERYTHROCYTE [DISTWIDTH] IN BLOOD BY AUTOMATED COUNT: 16 % (ref 11–15)
HCT VFR BLD AUTO: 41.2 %
HGB BLD-MCNC: 12.5 G/DL
IMM GRANULOCYTES # BLD AUTO: 0.01 X10(3) UL (ref 0–1)
IMM GRANULOCYTES NFR BLD: 0.1 %
LYMPHOCYTES # BLD AUTO: 2.35 X10(3) UL (ref 1–4)
LYMPHOCYTES NFR BLD AUTO: 30.3 %
MCH RBC QN AUTO: 20.5 PG (ref 26–34)
MCHC RBC AUTO-ENTMCNC: 30.3 G/DL (ref 31–37)
MCV RBC AUTO: 67.7 FL
MONOCYTES # BLD AUTO: 0.36 X10(3) UL (ref 0.1–1)
MONOCYTES NFR BLD AUTO: 4.6 %
NEUTROPHILS # BLD AUTO: 4.66 X10 (3) UL (ref 1.5–7.7)
NEUTROPHILS # BLD AUTO: 4.66 X10(3) UL (ref 1.5–7.7)
NEUTROPHILS NFR BLD AUTO: 60.1 %
PLATELET # BLD AUTO: 179 10(3)UL (ref 150–450)
PLATELET MORPHOLOGY: NORMAL
RBC # BLD AUTO: 6.09 X10(6)UL
WBC # BLD AUTO: 7.8 X10(3) UL (ref 4–11)

## 2022-06-24 PROCEDURE — 36415 COLL VENOUS BLD VENIPUNCTURE: CPT

## 2022-06-24 PROCEDURE — 85025 COMPLETE CBC W/AUTO DIFF WBC: CPT

## 2022-07-22 ENCOUNTER — OFFICE VISIT (OUTPATIENT)
Dept: FAMILY MEDICINE CLINIC | Facility: CLINIC | Age: 37
End: 2022-07-22
Payer: COMMERCIAL

## 2022-07-22 VITALS
HEIGHT: 65 IN | TEMPERATURE: 98 F | HEART RATE: 69 BPM | SYSTOLIC BLOOD PRESSURE: 101 MMHG | DIASTOLIC BLOOD PRESSURE: 69 MMHG | WEIGHT: 169 LBS | BODY MASS INDEX: 28.16 KG/M2

## 2022-07-22 DIAGNOSIS — Z37.9: Primary | ICD-10-CM

## 2022-07-22 DIAGNOSIS — M62.89 PELVIC FLOOR DYSFUNCTION IN FEMALE: ICD-10-CM

## 2022-07-22 DIAGNOSIS — Z84.89 FAMILY HISTORY OF BRAIN TUMOR: ICD-10-CM

## 2022-07-22 DIAGNOSIS — G44.201 ACUTE INTRACTABLE TENSION-TYPE HEADACHE: ICD-10-CM

## 2022-07-22 DIAGNOSIS — R20.2 FACIAL PARESTHESIA: ICD-10-CM

## 2022-07-22 DIAGNOSIS — M54.2 POSTERIOR NECK PAIN: ICD-10-CM

## 2022-07-22 PROCEDURE — 3008F BODY MASS INDEX DOCD: CPT | Performed by: FAMILY MEDICINE

## 2022-07-22 PROCEDURE — 3078F DIAST BP <80 MM HG: CPT | Performed by: FAMILY MEDICINE

## 2022-07-22 PROCEDURE — 99214 OFFICE O/P EST MOD 30 MIN: CPT | Performed by: FAMILY MEDICINE

## 2022-07-22 PROCEDURE — 3074F SYST BP LT 130 MM HG: CPT | Performed by: FAMILY MEDICINE

## 2022-07-22 RX ORDER — ACETAMINOPHEN AND CODEINE PHOSPHATE 120; 12 MG/5ML; MG/5ML
0.35 SOLUTION ORAL DAILY
COMMUNITY
Start: 2022-06-21

## 2022-07-22 RX ORDER — CYCLOBENZAPRINE HCL 10 MG
10 TABLET ORAL 2 TIMES DAILY PRN
Qty: 30 TABLET | Refills: 1 | Status: SHIPPED | OUTPATIENT
Start: 2022-07-22

## 2022-07-22 RX ORDER — IBUPROFEN 400 MG/1
400 TABLET ORAL EVERY 8 HOURS PRN
Qty: 30 TABLET | Refills: 0 | Status: SHIPPED | OUTPATIENT
Start: 2022-07-22

## 2022-07-27 ENCOUNTER — HOSPITAL ENCOUNTER (OUTPATIENT)
Dept: CT IMAGING | Age: 37
Discharge: HOME | End: 2022-07-27
Attending: FAMILY MEDICINE
Payer: COMMERCIAL

## 2022-07-27 DIAGNOSIS — R20.2 FACIAL PARESTHESIA: ICD-10-CM

## 2022-07-27 DIAGNOSIS — Z84.89 FAMILY HISTORY OF BRAIN TUMOR: ICD-10-CM

## 2022-07-27 DIAGNOSIS — G44.201 ACUTE INTRACTABLE TENSION-TYPE HEADACHE: ICD-10-CM

## 2022-07-27 PROCEDURE — 70450 CT HEAD/BRAIN W/O DYE: CPT | Performed by: FAMILY MEDICINE

## 2022-11-16 ENCOUNTER — NURSE TRIAGE (OUTPATIENT)
Dept: FAMILY MEDICINE CLINIC | Facility: CLINIC | Age: 37
End: 2022-11-16

## 2022-11-16 NOTE — TELEPHONE ENCOUNTER
Left message to call back or check her [employer] message. [employer] active, message sent.

## 2022-11-16 NOTE — TELEPHONE ENCOUNTER
Patient scheduled appointment for 12/01/22 through [facility] with the following:       Numb/stiff feeling in feet and hands. Diabetics?

## 2022-11-17 ENCOUNTER — TELEMEDICINE (OUTPATIENT)
Dept: FAMILY MEDICINE CLINIC | Facility: CLINIC | Age: 37
End: 2022-11-17
Payer: COMMERCIAL

## 2022-11-17 ENCOUNTER — TELEPHONE (OUTPATIENT)
Dept: FAMILY MEDICINE CLINIC | Facility: CLINIC | Age: 37
End: 2022-11-17

## 2022-11-17 DIAGNOSIS — Z00.00 ENCOUNTER FOR ANNUAL HEALTH EXAMINATION: Primary | ICD-10-CM

## 2022-11-17 DIAGNOSIS — M25.641 HAND JOINT STIFF, RIGHT: Primary | ICD-10-CM

## 2022-11-17 DIAGNOSIS — M25.541 ARTHRALGIA OF RIGHT HAND: ICD-10-CM

## 2022-11-17 PROCEDURE — 99213 OFFICE O/P EST LOW 20 MIN: CPT | Performed by: FAMILY MEDICINE

## 2022-11-17 NOTE — PROGRESS NOTES
Virtual Telephone Check-In    Hattie Martinez verbally consents to a Virtual/Telephone Check-In service on 11/17/22. Patient understands and accepts financial responsibility for any deductible, co-insurance and/or co-pays associated with this service. Duration of the service: 15 minutes  This telemedicine visit was conducted using live audio and video. Summary of topics discussed:     Patient calling describing stiffness in the palm of her right hand and on the plantar surface of her right foot. Her left hand and left foot are also affected but less so. Denies any obvious injury, trauma, repetition, new exercise or activity that she would link to the stiffness. States that her mother had early onset arthritis. Denies any numbness or tingling in the fingers of her hands or toes. Has not trialed NSAIDs. She is currently breast-feeding. Physical Exam:  General: alert, speaking in full sentences, no acute distress  Lungs: respirations sound unlabored, no audible wheezing with speaking. Neurologic: alert, oriented x3    Assessment and plan:    1. Hand joint stiff, right  - nsaids prn, if no improvement will send to ortho for eval, possibly OT  - XR HAND (MIN 3 VIEWS), RIGHT (CPT=73130); Future  - CONNECTIVE TISSUE DISEASE (BRANDEN) SCREEN; Future    2. Arthralgia of right hand  - CONNECTIVE TISSUE DISEASE (BRANDEN) SCREEN; Future      Patient labs ordered and physical exam scheduled for follow-up. Advised to call back clinic if no improvement in symptoms. Red flags discussed to go to ER.      Ammon Dasilva MD

## 2022-11-18 ENCOUNTER — HOSPITAL ENCOUNTER (OUTPATIENT)
Dept: GENERAL RADIOLOGY | Age: 37
Discharge: HOME | End: 2022-11-18
Attending: FAMILY MEDICINE
Payer: COMMERCIAL

## 2022-11-18 ENCOUNTER — LAB ENCOUNTER (OUTPATIENT)
Dept: LAB | Age: 37
End: 2022-11-18
Attending: FAMILY MEDICINE
Payer: COMMERCIAL

## 2022-11-18 DIAGNOSIS — M25.641 HAND JOINT STIFF, RIGHT: ICD-10-CM

## 2022-11-18 DIAGNOSIS — M25.541 ARTHRALGIA OF RIGHT HAND: ICD-10-CM

## 2022-11-18 DIAGNOSIS — Z00.00 ENCOUNTER FOR ANNUAL HEALTH EXAMINATION: ICD-10-CM

## 2022-11-18 LAB
ALBUMIN SERPL-MCNC: 3.8 G/DL (ref 3.4–5)
ALBUMIN/GLOB SERPL: 1.2 {RATIO} (ref 1–2)
ALP LIVER SERPL-CCNC: 84 U/L
ALT SERPL-CCNC: 25 U/L
ANION GAP SERPL CALC-SCNC: 4 MMOL/L (ref 0–18)
AST SERPL-CCNC: 11 U/L (ref 15–37)
BASOPHILS # BLD AUTO: 0.07 X10(3) UL (ref 0–0.2)
BASOPHILS NFR BLD AUTO: 0.9 %
BILIRUB SERPL-MCNC: 0.3 MG/DL (ref 0.1–2)
BUN BLD-MCNC: 14 MG/DL (ref 7–18)
BUN/CREAT SERPL: 22.2 (ref 10–20)
CALCIUM BLD-MCNC: 8.9 MG/DL (ref 8.5–10.1)
CHLORIDE SERPL-SCNC: 107 MMOL/L (ref 98–112)
CHOLEST SERPL-MCNC: 212 MG/DL (ref ?–200)
CO2 SERPL-SCNC: 28 MMOL/L (ref 21–32)
CREAT BLD-MCNC: 0.63 MG/DL
DEPRECATED RDW RBC AUTO: 32.4 FL (ref 35.1–46.3)
EOSINOPHIL # BLD AUTO: 0.27 X10(3) UL (ref 0–0.7)
EOSINOPHIL NFR BLD AUTO: 3.6 %
ERYTHROCYTE [DISTWIDTH] IN BLOOD BY AUTOMATED COUNT: 13.6 % (ref 11–15)
EST. AVERAGE GLUCOSE BLD GHB EST-MCNC: 126 MG/DL (ref 68–126)
FASTING PATIENT LIPID ANSWER: YES
FASTING STATUS PATIENT QL REPORTED: YES
GFR SERPLBLD BASED ON 1.73 SQ M-ARVRAT: 118 ML/MIN/1.73M2 (ref 60–?)
GLOBULIN PLAS-MCNC: 3.3 G/DL (ref 2.8–4.4)
GLUCOSE BLD-MCNC: 86 MG/DL (ref 70–99)
HBA1C MFR BLD: 6 % (ref ?–5.7)
HCT VFR BLD AUTO: 42.5 %
HDLC SERPL-MCNC: 46 MG/DL (ref 40–59)
HGB BLD-MCNC: 13.1 G/DL
IMM GRANULOCYTES # BLD AUTO: 0.01 X10(3) UL (ref 0–1)
IMM GRANULOCYTES NFR BLD: 0.1 %
LDLC SERPL CALC-MCNC: 151 MG/DL (ref ?–100)
LYMPHOCYTES # BLD AUTO: 1.93 X10(3) UL (ref 1–4)
LYMPHOCYTES NFR BLD AUTO: 25.6 %
MCH RBC QN AUTO: 20.9 PG (ref 26–34)
MCHC RBC AUTO-ENTMCNC: 30.8 G/DL (ref 31–37)
MCV RBC AUTO: 67.9 FL
MONOCYTES # BLD AUTO: 0.42 X10(3) UL (ref 0.1–1)
MONOCYTES NFR BLD AUTO: 5.6 %
NEUTROPHILS # BLD AUTO: 4.83 X10 (3) UL (ref 1.5–7.7)
NEUTROPHILS # BLD AUTO: 4.83 X10(3) UL (ref 1.5–7.7)
NEUTROPHILS NFR BLD AUTO: 64.2 %
NONHDLC SERPL-MCNC: 166 MG/DL (ref ?–130)
OSMOLALITY SERPL CALC.SUM OF ELEC: 288 MOSM/KG (ref 275–295)
PLATELET # BLD AUTO: 207 10(3)UL (ref 150–450)
POTASSIUM SERPL-SCNC: 4.1 MMOL/L (ref 3.5–5.1)
PROT SERPL-MCNC: 7.1 G/DL (ref 6.4–8.2)
RBC # BLD AUTO: 6.26 X10(6)UL
SODIUM SERPL-SCNC: 139 MMOL/L (ref 136–145)
TRIGL SERPL-MCNC: 82 MG/DL (ref 30–149)
TSI SER-ACNC: 1.26 MIU/ML (ref 0.36–3.74)
VLDLC SERPL CALC-MCNC: 16 MG/DL (ref 0–30)
WBC # BLD AUTO: 7.5 X10(3) UL (ref 4–11)

## 2022-11-18 PROCEDURE — 80053 COMPREHEN METABOLIC PANEL: CPT

## 2022-11-18 PROCEDURE — 85060 BLOOD SMEAR INTERPRETATION: CPT

## 2022-11-18 PROCEDURE — 73130 X-RAY EXAM OF HAND: CPT | Performed by: FAMILY MEDICINE

## 2022-11-18 PROCEDURE — 36415 COLL VENOUS BLD VENIPUNCTURE: CPT

## 2022-11-18 PROCEDURE — 86225 DNA ANTIBODY NATIVE: CPT

## 2022-11-18 PROCEDURE — 84443 ASSAY THYROID STIM HORMONE: CPT

## 2022-11-18 PROCEDURE — 80061 LIPID PANEL: CPT

## 2022-11-18 PROCEDURE — 86038 ANTINUCLEAR ANTIBODIES: CPT

## 2022-11-18 PROCEDURE — 85025 COMPLETE CBC W/AUTO DIFF WBC: CPT

## 2022-11-18 PROCEDURE — 83036 HEMOGLOBIN GLYCOSYLATED A1C: CPT

## 2022-11-21 LAB
DSDNA IGG SERPL IA-ACNC: 5.3 IU/ML
ENA AB SER QL IA: 0.2 UG/L
ENA AB SER QL IA: NEGATIVE

## 2022-12-01 ENCOUNTER — OFFICE VISIT (OUTPATIENT)
Dept: FAMILY MEDICINE CLINIC | Facility: CLINIC | Age: 37
End: 2022-12-01
Payer: COMMERCIAL

## 2022-12-01 VITALS
SYSTOLIC BLOOD PRESSURE: 111 MMHG | DIASTOLIC BLOOD PRESSURE: 72 MMHG | HEART RATE: 76 BPM | HEIGHT: 65 IN | BODY MASS INDEX: 28.49 KG/M2 | WEIGHT: 171 LBS | TEMPERATURE: 98 F

## 2022-12-01 DIAGNOSIS — D22.9 CHANGE IN MOLE: ICD-10-CM

## 2022-12-01 DIAGNOSIS — Z83.49 FAMILY HISTORY OF THYROID DISEASE: ICD-10-CM

## 2022-12-01 DIAGNOSIS — Z00.00 ENCOUNTER FOR ANNUAL HEALTH EXAMINATION: Primary | ICD-10-CM

## 2022-12-01 DIAGNOSIS — R73.03 PREDIABETES: ICD-10-CM

## 2022-12-01 DIAGNOSIS — E78.00 HYPERCHOLESTEREMIA: ICD-10-CM

## 2022-12-01 DIAGNOSIS — E04.9 THYROID ENLARGED: ICD-10-CM

## 2022-12-01 PROCEDURE — 3008F BODY MASS INDEX DOCD: CPT | Performed by: FAMILY MEDICINE

## 2022-12-01 PROCEDURE — 99395 PREV VISIT EST AGE 18-39: CPT | Performed by: FAMILY MEDICINE

## 2022-12-01 PROCEDURE — 3074F SYST BP LT 130 MM HG: CPT | Performed by: FAMILY MEDICINE

## 2022-12-01 PROCEDURE — 3078F DIAST BP <80 MM HG: CPT | Performed by: FAMILY MEDICINE

## 2023-01-04 ENCOUNTER — HOSPITAL ENCOUNTER (OUTPATIENT)
Dept: ULTRASOUND IMAGING | Age: 38
Discharge: HOME | End: 2023-01-04
Attending: FAMILY MEDICINE
Payer: COMMERCIAL

## 2023-01-04 DIAGNOSIS — Z83.49 FAMILY HISTORY OF THYROID DISEASE: ICD-10-CM

## 2023-01-04 DIAGNOSIS — E04.9 THYROID ENLARGED: ICD-10-CM

## 2023-01-04 PROCEDURE — 76536 US EXAM OF HEAD AND NECK: CPT | Performed by: FAMILY MEDICINE

## 2023-01-12 ENCOUNTER — TELEPHONE (OUTPATIENT)
Dept: PHYSICAL THERAPY | Facility: HOSPITAL | Age: 38
End: 2023-01-12

## 2023-01-16 ENCOUNTER — OFFICE VISIT (OUTPATIENT)
Dept: PHYSICAL THERAPY | Age: 38
End: 2023-01-16
Attending: FAMILY MEDICINE
Payer: COMMERCIAL

## 2023-01-16 DIAGNOSIS — M62.89 PELVIC FLOOR DYSFUNCTION IN FEMALE: ICD-10-CM

## 2023-01-16 DIAGNOSIS — Z37.9: ICD-10-CM

## 2023-01-16 PROCEDURE — 97161 PT EVAL LOW COMPLEX 20 MIN: CPT

## 2023-01-16 PROCEDURE — 97112 NEUROMUSCULAR REEDUCATION: CPT

## 2023-01-16 PROCEDURE — 97110 THERAPEUTIC EXERCISES: CPT

## 2023-01-26 ENCOUNTER — OFFICE VISIT (OUTPATIENT)
Dept: PHYSICAL THERAPY | Age: 38
End: 2023-01-26
Attending: FAMILY MEDICINE
Payer: COMMERCIAL

## 2023-01-26 PROCEDURE — 97140 MANUAL THERAPY 1/> REGIONS: CPT

## 2023-02-02 ENCOUNTER — APPOINTMENT (OUTPATIENT)
Dept: PHYSICAL THERAPY | Age: 38
End: 2023-02-02
Attending: FAMILY MEDICINE
Payer: COMMERCIAL

## 2023-02-16 ENCOUNTER — OFFICE VISIT (OUTPATIENT)
Dept: PHYSICAL THERAPY | Age: 38
End: 2023-02-16
Attending: FAMILY MEDICINE
Payer: COMMERCIAL

## 2023-02-16 PROCEDURE — 97110 THERAPEUTIC EXERCISES: CPT

## 2023-02-16 PROCEDURE — 97140 MANUAL THERAPY 1/> REGIONS: CPT

## 2023-02-16 PROCEDURE — 97112 NEUROMUSCULAR REEDUCATION: CPT

## 2023-02-21 ENCOUNTER — OFFICE VISIT (OUTPATIENT)
Dept: PHYSICAL THERAPY | Age: 38
End: 2023-02-21
Attending: FAMILY MEDICINE
Payer: COMMERCIAL

## 2023-02-21 PROCEDURE — 97140 MANUAL THERAPY 1/> REGIONS: CPT

## 2023-02-28 ENCOUNTER — OFFICE VISIT (OUTPATIENT)
Dept: PHYSICAL THERAPY | Age: 38
End: 2023-02-28
Attending: FAMILY MEDICINE
Payer: COMMERCIAL

## 2023-02-28 PROCEDURE — 97140 MANUAL THERAPY 1/> REGIONS: CPT

## 2023-03-02 ENCOUNTER — PATIENT MESSAGE (OUTPATIENT)
Dept: FAMILY MEDICINE CLINIC | Facility: CLINIC | Age: 38
End: 2023-03-02

## 2023-03-03 NOTE — TELEPHONE ENCOUNTER
From: [employer]  To: Ashleigh Jaramillo MD  Sent: 3/2/2023 8:32 AM CST  Subject: [address] Screening    This message is being sent by Azul Hull on behalf of [employer]. Good morning ,  My  is an [employer] employee. We both need to complete our health screenings for insurance purposes. Is there any way you or a member of your staff could please fill out the attached form with the numbers from my December annual check up and fax it to the noted address? Feel free to let me know if this request is not possible. Thanks so much Dr Gustavo Gomez!

## 2023-03-07 ENCOUNTER — OFFICE VISIT (OUTPATIENT)
Dept: PHYSICAL THERAPY | Age: 38
End: 2023-03-07
Attending: FAMILY MEDICINE
Payer: COMMERCIAL

## 2023-03-07 PROCEDURE — 97140 MANUAL THERAPY 1/> REGIONS: CPT

## 2023-03-07 PROCEDURE — 97112 NEUROMUSCULAR REEDUCATION: CPT

## 2023-03-14 ENCOUNTER — APPOINTMENT (OUTPATIENT)
Dept: PHYSICAL THERAPY | Age: 38
End: 2023-03-14
Attending: FAMILY MEDICINE
Payer: COMMERCIAL

## 2023-03-21 ENCOUNTER — APPOINTMENT (OUTPATIENT)
Dept: PHYSICAL THERAPY | Age: 38
End: 2023-03-21
Attending: FAMILY MEDICINE
Payer: COMMERCIAL

## 2023-03-21 ENCOUNTER — TELEPHONE (OUTPATIENT)
Dept: PHYSICAL THERAPY | Facility: HOSPITAL | Age: 38
End: 2023-03-21

## 2023-04-04 ENCOUNTER — APPOINTMENT (OUTPATIENT)
Dept: PHYSICAL THERAPY | Age: 38
End: 2023-04-04
Attending: FAMILY MEDICINE
Payer: COMMERCIAL

## 2023-05-18 ENCOUNTER — OFFICE VISIT (OUTPATIENT)
Dept: DERMATOLOGY CLINIC | Facility: CLINIC | Age: 38
End: 2023-05-18

## 2023-05-18 DIAGNOSIS — L81.4 LENTIGO: ICD-10-CM

## 2023-05-18 DIAGNOSIS — D23.9 BENIGN NEOPLASM OF SKIN, UNSPECIFIED LOCATION: ICD-10-CM

## 2023-05-18 DIAGNOSIS — D22.9 MULTIPLE NEVI: Primary | ICD-10-CM

## 2023-05-18 DIAGNOSIS — D23.4 BENIGN NEOPLASM OF SCALP AND SKIN OF NECK: ICD-10-CM

## 2023-05-18 PROCEDURE — 99203 OFFICE O/P NEW LOW 30 MIN: CPT | Performed by: DERMATOLOGY

## 2023-10-05 ENCOUNTER — TELEPHONE (OUTPATIENT)
Dept: OBGYN CLINIC | Facility: CLINIC | Age: 38
End: 2023-10-05

## 2023-10-05 NOTE — TELEPHONE ENCOUNTER
Patient calling to initiate prenatal care  LMP 8-25-23  Patient is 7-8 weeks on 10-20-23  Confirmation Ultrasound and Appointment scheduled on   Future Appointments   Date Time Provider Department Center   11/8/2023  7:30 AM [facility] [place]   11/8/2023  8:15 AM Riccardo Giles MD [facility] [place]   11/29/2023 12:30 PM Arlyn Molina MD [facility] [place]   12/20/2023  7:30 AM Felipa Knox MD [facility] [place]         Any history of ectopic pregnancy? no  Any history of miscarriage? no  Any medications that you are taking on a regular basis other than prenatal vitamins? no (if not taking prenatal vitamins, encourage patient to start taking.)  Any bleeding since the first day of last LMP and your positive pregnancy test? no    Insurance bcbs epo

## 2023-11-08 ENCOUNTER — LAB ENCOUNTER (OUTPATIENT)
Dept: LAB | Age: 38
End: 2023-11-08
Attending: STUDENT IN AN ORGANIZED HEALTH CARE EDUCATION/TRAINING PROGRAM
Payer: COMMERCIAL

## 2023-11-08 ENCOUNTER — OFFICE VISIT (OUTPATIENT)
Dept: OBGYN CLINIC | Facility: CLINIC | Age: 38
End: 2023-11-08
Payer: COMMERCIAL

## 2023-11-08 ENCOUNTER — ULTRASOUND ENCOUNTER (OUTPATIENT)
Dept: OBGYN CLINIC | Facility: CLINIC | Age: 38
End: 2023-11-08
Payer: COMMERCIAL

## 2023-11-08 VITALS
WEIGHT: 166.38 LBS | BODY MASS INDEX: 28 KG/M2 | HEART RATE: 76 BPM | SYSTOLIC BLOOD PRESSURE: 108 MMHG | DIASTOLIC BLOOD PRESSURE: 70 MMHG

## 2023-11-08 DIAGNOSIS — Z3A.10 10 WEEKS GESTATION OF PREGNANCY: ICD-10-CM

## 2023-11-08 DIAGNOSIS — N91.1 SECONDARY AMENORRHEA: Primary | ICD-10-CM

## 2023-11-08 PROBLEM — O34.219 HX SUCCESSFUL VBAC (VAGINAL BIRTH AFTER CESAREAN), CURRENTLY PREGNANT: Status: ACTIVE | Noted: 2023-11-08

## 2023-11-08 PROBLEM — Z98.891 HISTORY OF CESAREAN SECTION: Status: RESOLVED | Noted: 2021-11-03 | Resolved: 2023-11-08

## 2023-11-08 PROBLEM — O34.219 HX SUCCESSFUL VBAC (VAGINAL BIRTH AFTER CESAREAN), CURRENTLY PREGNANT (HCC): Status: ACTIVE | Noted: 2023-11-08

## 2023-11-08 LAB
ANTIBODY SCREEN: NEGATIVE
BASOPHILS # BLD AUTO: 0.03 X10(3) UL (ref 0–0.2)
BASOPHILS NFR BLD AUTO: 0.4 %
EOSINOPHIL # BLD AUTO: 0.12 X10(3) UL (ref 0–0.7)
EOSINOPHIL NFR BLD AUTO: 1.6 %
ERYTHROCYTE [DISTWIDTH] IN BLOOD BY AUTOMATED COUNT: 15.1 %
HBV SURFACE AG SER-ACNC: <0.1 [IU]/L
HBV SURFACE AG SERPL QL IA: NONREACTIVE
HCT VFR BLD AUTO: 38.6 %
HCV AB SERPL QL IA: NONREACTIVE
HGB BLD-MCNC: 12.3 G/DL
IMM GRANULOCYTES # BLD AUTO: 0.03 X10(3) UL (ref 0–1)
IMM GRANULOCYTES NFR BLD: 0.4 %
LYMPHOCYTES # BLD AUTO: 1.5 X10(3) UL (ref 1–4)
LYMPHOCYTES NFR BLD AUTO: 19.6 %
MCH RBC QN AUTO: 21.2 PG (ref 26–34)
MCHC RBC AUTO-ENTMCNC: 31.9 G/DL (ref 31–37)
MCV RBC AUTO: 66.6 FL
MONOCYTES # BLD AUTO: 0.41 X10(3) UL (ref 0.1–1)
MONOCYTES NFR BLD AUTO: 5.4 %
NEUTROPHILS # BLD AUTO: 5.56 X10 (3) UL (ref 1.5–7.7)
NEUTROPHILS # BLD AUTO: 5.56 X10(3) UL (ref 1.5–7.7)
NEUTROPHILS NFR BLD AUTO: 72.6 %
PLATELET # BLD AUTO: 176 10(3)UL (ref 150–450)
RBC # BLD AUTO: 5.8 X10(6)UL
RH BLOOD TYPE: POSITIVE
T PALLIDUM AB SER QL IA: NONREACTIVE
WBC # BLD AUTO: 7.7 X10(3) UL (ref 4–11)

## 2023-11-08 PROCEDURE — 3078F DIAST BP <80 MM HG: CPT | Performed by: STUDENT IN AN ORGANIZED HEALTH CARE EDUCATION/TRAINING PROGRAM

## 2023-11-08 PROCEDURE — 3074F SYST BP LT 130 MM HG: CPT | Performed by: STUDENT IN AN ORGANIZED HEALTH CARE EDUCATION/TRAINING PROGRAM

## 2023-11-08 PROCEDURE — 76856 US EXAM PELVIC COMPLETE: CPT | Performed by: STUDENT IN AN ORGANIZED HEALTH CARE EDUCATION/TRAINING PROGRAM

## 2023-11-08 PROCEDURE — 99203 OFFICE O/P NEW LOW 30 MIN: CPT | Performed by: STUDENT IN AN ORGANIZED HEALTH CARE EDUCATION/TRAINING PROGRAM

## 2023-11-08 NOTE — PATIENT INSTRUCTIONS
Unisom (doxylamine) 25mg  B6 25mg    Take 1 tablet of Unisom and 1-2 tablets of B6 at bedtime. If this does not improve your nausea in 2 days, then take 1/2 tablet of Unisom and 1 tablet of B6 in the morning. If this still does not improve your nausea in 2 days, then add an additional 1/2 tablet of Unisom and 1 tablet of B6 at noon.       Magnesium oxide 200mg once or twice a day

## 2023-11-10 LAB — VZV IGG SER IA-ACNC: >4000 (ref 165–?)

## 2023-11-13 LAB
11Q23 DELETION (JACOBSEN): NOT DETECTED
15Q11 DELETION (PW ANGELMAN): NOT DETECTED
1P36 DELETION SYNDROME: NOT DETECTED
22Q11 DELETION (DIGEORGE): NOT DETECTED
4P16 DELETION(WOLF-HIRSCHHORN): NOT DETECTED
5P15 DELETION (CRI-DU-CHAT): NOT DETECTED
8Q24 DELETION (LANGER-GIEDION): NOT DETECTED
GESTATIONAL AGE > OR = 9W:: YES
MONOSOMY X (TURNER SYNDROME): NOT DETECTED
TEST RESULT: NEGATIVE
TRISOMY 13 (PATAU SYNDROME): NEGATIVE
TRISOMY 16: NOT DETECTED
TRISOMY 18 (EDWARDS SYNDROME): NEGATIVE
TRISOMY 21 (DOWN SYNDROME): NEGATIVE
TRISOMY 22: NOT DETECTED
XXX (TRIPLE X SYNDROME): NOT DETECTED
XXY (KLINEFELTER SYNDROME): NOT DETECTED
XYY (JACOBS SYNDROME): NOT DETECTED

## 2023-11-29 ENCOUNTER — INITIAL PRENATAL (OUTPATIENT)
Dept: OBGYN CLINIC | Facility: CLINIC | Age: 38
End: 2023-11-29
Payer: COMMERCIAL

## 2023-11-29 VITALS
HEART RATE: 102 BPM | SYSTOLIC BLOOD PRESSURE: 110 MMHG | WEIGHT: 168.5 LBS | BODY MASS INDEX: 28 KG/M2 | DIASTOLIC BLOOD PRESSURE: 68 MMHG

## 2023-11-29 DIAGNOSIS — Z34.80 SUPERVISION OF OTHER NORMAL PREGNANCY, ANTEPARTUM: Primary | ICD-10-CM

## 2023-11-29 DIAGNOSIS — R00.0 TACHYCARDIA: ICD-10-CM

## 2023-11-29 DIAGNOSIS — O34.219 HX SUCCESSFUL VBAC (VAGINAL BIRTH AFTER CESAREAN), CURRENTLY PREGNANT: ICD-10-CM

## 2023-11-29 DIAGNOSIS — Z12.4 SCREENING FOR CERVICAL CANCER: ICD-10-CM

## 2023-11-29 DIAGNOSIS — O09.891 MEDICATION EXPOSURE DURING FIRST TRIMESTER OF PREGNANCY: ICD-10-CM

## 2023-11-29 DIAGNOSIS — O09.529 ANTEPARTUM MULTIGRAVIDA OF ADVANCED MATERNAL AGE: ICD-10-CM

## 2023-11-29 PROCEDURE — 87624 HPV HI-RISK TYP POOLED RSLT: CPT | Performed by: OBSTETRICS & GYNECOLOGY

## 2023-11-29 PROCEDURE — 88175 CYTOPATH C/V AUTO FLUID REDO: CPT | Performed by: OBSTETRICS & GYNECOLOGY

## 2023-11-29 PROCEDURE — 87086 URINE CULTURE/COLONY COUNT: CPT | Performed by: OBSTETRICS & GYNECOLOGY

## 2023-11-29 PROCEDURE — 3074F SYST BP LT 130 MM HG: CPT | Performed by: OBSTETRICS & GYNECOLOGY

## 2023-11-29 PROCEDURE — 87491 CHLMYD TRACH DNA AMP PROBE: CPT | Performed by: OBSTETRICS & GYNECOLOGY

## 2023-11-29 PROCEDURE — 87591 N.GONORRHOEAE DNA AMP PROB: CPT | Performed by: OBSTETRICS & GYNECOLOGY

## 2023-11-29 PROCEDURE — 3078F DIAST BP <80 MM HG: CPT | Performed by: OBSTETRICS & GYNECOLOGY

## 2023-11-30 ENCOUNTER — TELEPHONE (OUTPATIENT)
Dept: OBGYN CLINIC | Facility: CLINIC | Age: 38
End: 2023-11-30

## 2023-11-30 DIAGNOSIS — Z13.79 GENETIC SCREENING: ICD-10-CM

## 2023-11-30 DIAGNOSIS — Z13.228 CYSTIC FIBROSIS SCREENING: Primary | ICD-10-CM

## 2023-11-30 LAB
C TRACH DNA SPEC QL NAA+PROBE: NEGATIVE
N GONORRHOEA DNA SPEC QL NAA+PROBE: NEGATIVE

## 2023-11-30 NOTE — TELEPHONE ENCOUNTER
Patient notified of appropriate time frame to complete requested labs. Patient aware that orders are still pending provider signature. Patient verbalized understanding.

## 2023-11-30 NOTE — TELEPHONE ENCOUNTER
Voicemail left for patient to return our call. Office phone number provided. Patient was seen for Initial Prenatal visit on 11/29/2023. Patient is currently 13w6d. AFP lab is done between 16-20 weeks. Cystic Fibrosis is okay at anytime. Orders pended for provider approval.  Patient will need to be instructed on appropriate timing to have labs done.

## 2023-12-04 LAB
.: NORMAL
.: NORMAL

## 2023-12-05 LAB — HPV I/H RISK 1 DNA SPEC QL NAA+PROBE: NEGATIVE

## 2023-12-19 ENCOUNTER — LAB ENCOUNTER (OUTPATIENT)
Dept: LAB | Age: 38
End: 2023-12-19
Attending: OBSTETRICS & GYNECOLOGY
Payer: COMMERCIAL

## 2023-12-19 DIAGNOSIS — Z13.79 GENETIC SCREENING: ICD-10-CM

## 2023-12-19 DIAGNOSIS — Z13.228 CYSTIC FIBROSIS SCREENING: ICD-10-CM

## 2023-12-19 PROCEDURE — 36415 COLL VENOUS BLD VENIPUNCTURE: CPT

## 2023-12-19 PROCEDURE — 82105 ALPHA-FETOPROTEIN SERUM: CPT

## 2023-12-19 PROCEDURE — 81220 CFTR GENE COM VARIANTS: CPT

## 2023-12-20 ENCOUNTER — ROUTINE PRENATAL (OUTPATIENT)
Dept: OBGYN CLINIC | Facility: CLINIC | Age: 38
End: 2023-12-20
Payer: COMMERCIAL

## 2023-12-20 VITALS
DIASTOLIC BLOOD PRESSURE: 70 MMHG | SYSTOLIC BLOOD PRESSURE: 112 MMHG | BODY MASS INDEX: 28 KG/M2 | HEART RATE: 89 BPM | WEIGHT: 170 LBS

## 2023-12-20 DIAGNOSIS — Z34.82 ENCOUNTER FOR SUPERVISION OF OTHER NORMAL PREGNANCY IN SECOND TRIMESTER: Primary | ICD-10-CM

## 2023-12-20 DIAGNOSIS — M79.661 PAIN OF RIGHT LOWER LEG: ICD-10-CM

## 2023-12-20 PROCEDURE — 3078F DIAST BP <80 MM HG: CPT | Performed by: OBSTETRICS & GYNECOLOGY

## 2023-12-20 PROCEDURE — 3074F SYST BP LT 130 MM HG: CPT | Performed by: OBSTETRICS & GYNECOLOGY

## 2023-12-20 NOTE — PROGRESS NOTES
[id number] 16w5d seen for routine OB visit. Denies ctx, lof, vb. Reports no FM yet. C/o palpitations and noted fast HR (up to 150) when exertional and 100s at rest. Also notes distal right leg pain, no swelling, able to ambulate. Patient Active Problem List   Diagnosis    AMA (advanced maternal age) multigravida 35+    Prediabetes    Hypercholesteremia    Hx successful  (vaginal birth after ), currently pregnant    Medication exposure during first trimester of pregnancy        TW lb (2.268 kg)   Depression Scale Total: 0 (2023 12:36 PM)      Vitals:    23 0729   BP: 112/70   Pulse: 89        Gen: AAOx3, NAD  Resp: breathing comfortably  Abdomen: gravid, soft, nontender  Ext: BLE symmetric, nontender, no edema or erythema. Mildly dilated superficial veins. Plan:  - venous doppler of RLE ordered, stat, as she is planning to travel Friday for the holidays.  - prenatal labs wnl  - genetic screening - low risk NIPT, msAFP and CF in process  - anatomy US level III with MFM   - return precautions reviewed    RTO in 4 week(s).

## 2023-12-21 ENCOUNTER — HOSPITAL ENCOUNTER (OUTPATIENT)
Dept: ULTRASOUND IMAGING | Facility: HOSPITAL | Age: 38
Discharge: HOME | End: 2023-12-21
Attending: OBSTETRICS & GYNECOLOGY
Payer: COMMERCIAL

## 2023-12-21 ENCOUNTER — TELEPHONE (OUTPATIENT)
Dept: OBGYN CLINIC | Facility: CLINIC | Age: 38
End: 2023-12-21

## 2023-12-21 DIAGNOSIS — Z34.82 ENCOUNTER FOR SUPERVISION OF OTHER NORMAL PREGNANCY IN SECOND TRIMESTER: ICD-10-CM

## 2023-12-21 DIAGNOSIS — M79.661 PAIN OF RIGHT LOWER LEG: ICD-10-CM

## 2023-12-21 LAB
AFP MOM: 1.34
AFP VALUE: 42 NG/ML
GA ON COLL DATE: 16 WEEKS
INSULIN DEP AFP: NO
MAT AGE AT EDD: 38 YR
MULTIPLE GEST AFP: NO
OSBR RISK 1 IN AFP: 4273
WEIGHT AFP: 168 LBS

## 2023-12-21 PROCEDURE — 93971 EXTREMITY STUDY: CPT | Performed by: OBSTETRICS & GYNECOLOGY

## 2023-12-21 NOTE — TELEPHONE ENCOUNTER
Mary from [facility] xray called, states she wanted to let Dr Omer Milton know abt pt's stat results. No nurse available to take call sherry. Elisha Hutchinsonica states to r/o to her if any questions but results also in pt  chart.

## 2023-12-21 NOTE — TELEPHONE ENCOUNTER
Results noted in chart of US Venous Doppler . Francesco Gillis please review. Francesco Gillis Pt already has a rolling walker, raised toilet seat at home and issued a cane

## 2024-01-08 ENCOUNTER — HOSPITAL ENCOUNTER (OUTPATIENT)
Dept: LAB | Facility: HOSPITAL | Age: 39
Discharge: HOME | End: 2024-01-08
Attending: INTERNAL MEDICINE
Payer: COMMERCIAL

## 2024-01-08 LAB
ALBUMIN SERPL-MCNC: 3.1 G/DL (ref 3.4–5)
ALBUMIN/GLOB SERPL: 0.7 {RATIO} (ref 1–2)
ALP LIVER SERPL-CCNC: 62 U/L
ALT SERPL-CCNC: 13 U/L
ANION GAP SERPL CALC-SCNC: 3 MMOL/L (ref 0–18)
AST SERPL-CCNC: 9 U/L (ref 15–37)
BILIRUB SERPL-MCNC: 0.2 MG/DL (ref 0.1–2)
BUN BLD-MCNC: 7 MG/DL (ref 9–23)
CALCIUM BLD-MCNC: 8.9 MG/DL (ref 8.5–10.1)
CHLORIDE SERPL-SCNC: 107 MMOL/L (ref 98–112)
CO2 SERPL-SCNC: 26 MMOL/L (ref 21–32)
CREAT BLD-MCNC: 0.54 MG/DL
EGFRCR SERPLBLD CKD-EPI 2021: 122 ML/MIN/1.73M2 (ref 60–?)
ERYTHROCYTE [DISTWIDTH] IN BLOOD BY AUTOMATED COUNT: 14.6 %
FASTING STATUS PATIENT QL REPORTED: NO
GLOBULIN PLAS-MCNC: 4.2 G/DL (ref 2.8–4.4)
GLUCOSE BLD-MCNC: 78 MG/DL (ref 70–99)
HCT VFR BLD AUTO: 37.8 %
HGB BLD-MCNC: 12.1 G/DL
MCH RBC QN AUTO: 21.6 PG (ref 26–34)
MCHC RBC AUTO-ENTMCNC: 32 G/DL (ref 31–37)
MCV RBC AUTO: 67.5 FL
OSMOLALITY SERPL CALC.SUM OF ELEC: 279 MOSM/KG (ref 275–295)
PLATELET # BLD AUTO: 173 10(3)UL (ref 150–450)
POTASSIUM SERPL-SCNC: 4 MMOL/L (ref 3.5–5.1)
PROT SERPL-MCNC: 7.3 G/DL (ref 6.4–8.2)
RBC # BLD AUTO: 5.6 X10(6)UL
SODIUM SERPL-SCNC: 136 MMOL/L (ref 136–145)
TSI SER-ACNC: 1.08 MIU/ML (ref 0.36–3.74)
WBC # BLD AUTO: 10.4 X10(3) UL (ref 4–11)

## 2024-01-08 PROCEDURE — 80053 COMPREHEN METABOLIC PANEL: CPT | Performed by: INTERNAL MEDICINE

## 2024-01-08 PROCEDURE — 85027 COMPLETE CBC AUTOMATED: CPT | Performed by: INTERNAL MEDICINE

## 2024-01-08 PROCEDURE — 84443 ASSAY THYROID STIM HORMONE: CPT | Performed by: INTERNAL MEDICINE

## 2024-01-08 PROCEDURE — 36415 COLL VENOUS BLD VENIPUNCTURE: CPT | Performed by: INTERNAL MEDICINE

## 2024-01-11 NOTE — PROGRESS NOTES
Outpatient Maternal-Fetal Medicine Consultation    Dear Dr. Molina,    Thank you for requesting ultrasound evaluation and maternal fetal medicine consultation on your patient Kal Cason.  As you are aware she is a 38 year old female with a Flores pregnancy at 20w0d.  A maternal-fetal medicine consultation was requested secondary to advanced maternal age.  Her prenatal records and labs were reviewed.    We reviewed that her maternal serum AFP was low probability for open neural tube defect.  She had negative cystic fibrosis carrier screening as well as a low probability maternity 21 screen for the common aneuploidies.    She is feeling well and her main concern is about her diagnosis of prediabetes and risk of gestational diabetes.    She was tested for prediabetes back in  at her request.  2022 her hemoglobin A1c was 6%.  Her father became type II diabetic at a young age (28 years of age).  After giving the diagnosis of prediabetes, she was not given instruction or further follow-up.    Prior to the pregnancy she was walking and lifting weights.  At consistency declined considerably after her most recent delivery in May 2022.    She was also concerned about the topical retinol use she used before she knew she was pregnant.  I reassured her that this is minimal exposure to the fetus and there are no signs of abnormalities at this time.    HISTORY  OB History    Para Term  AB Living   4 3 3 0 0 3   SAB IAB Ectopic Multiple Live Births   0 0 0 0 3     # 1 - Date: 17, Sex: Female, Weight: 7 lb 12.2 oz (3.52 kg), GA: 39w2d, Delivery: , Low Transverse, Apgar1: 8, Apgar5: 9, Living: Living, Birth Comments: Mother: G1 P 1001  GBS (pos)  HbsAg (neg)  Rubella immune RPR NR Blood Type O+  Infant:  AGA  Hearing Screen pass  CCHD pass    Time of birth 4:10 am   D/C bilirubin 8.80 at 62 hrs of life  Other labs:   Prenatal or post- problems?  Breech presentation   Mom GBS  POS    [facility] Metabolic Screen dated     # 2 - Date: 08/15/20, Sex: Female, Weight: 8 lb 4.6 oz (3.76 kg), GA: 38w5d, Delivery: Vaginal birth after caesarian, Apgar1: 8, Apgar5: 9, Living: Living, Birth Comments: Mother: G 2 P   GBS (pos) resistent to clinda which mom received 3 doses of, no maternal temp, baby doing fine   HbsAg (neg)  Rubella Immune RPR/Treponemal Ab NR      HIV neg  COVID not detected               Blood Type O pos   Infant: S/A/ or LGA? AGA  Blood type   Bev ()    Hearing Screen pass bilat CCHD pass    Time of birth 12:05am  SROM @11:45pm on 2020 (over 24 hours)  D/C bilirubin 13.6 at 53 hrs of life,   HR, serum bili at 56 hrs,  LIR  Other labs: cbc and  Blood culture, no growth 1 day  Prenatal or post-opal problems?     [facility] Metabolic Screen dated     # 3 - Date: 22, Sex: Female, Weight: 7 lb 5.5 oz (3.33 kg), GA: 38w0d, Delivery: Vaginal birth after caesarian, Apgar1: 9, Apgar5: 9, Living: Living, Birth Comments: None    # 4 - Date: None, Sex: None, Weight: None, GA: None, Delivery: None, Apgar1: None, Apgar5: None, Living: None, Birth Comments: None    Past Medical History  The patient  has a past medical history of Anemia and Back pain (7/15/2016).    Past Surgical History  The patient  has a past surgical history that includes laparoscopy,pelvic,biopsy (Left, 2012) and  (2017).    Family History  The patient She indicated that the status of her mother is unknown. She indicated that the status of her father is unknown. She indicated that the status of her sister is unknown.      Medications:   Current Outpatient Medications:     Prenatal Vit-Fe Fumarate-FA (PRENATAL VITAMINS PLUS) 27-1 MG Oral Tab, Take 1 tablet by mouth daily., Disp: , Rfl:   Allergies:   Allergies   Allergen Reactions    Amoxicillin HIVES    Penicillins HIVES         PHYSICAL EXAMINATION:  /64 (BP Location: Right arm, Patient Position: Sitting, Cuff Size: adult)   Pulse 92   Ht 5'  5\" (1.651 m)   Wt 175 lb (79.4 kg)   LMP 08/25/2023   BMI 29.12 kg/m²   General: alert and oriented,no acute distress  Abdomen: gravid, soft, non-tender  Extremities: non-tender, no edema      OBSTETRIC ULTRASOUND  The patient had a level 2 ultrasound today which I interpreted the results and reviewed them with the patient.    Ultrasound Findings:  Single IUP in transverse presentation.    Placenta is posterior.   A 3 vessel cord is noted.  Cardiac activity is present at 166 bpm   g ( 0 lb 13 oz);    MVP is 3.7 cm .     The fetal measurements are consistent with the established EDC. No ultrasound evidence of structural abnormalities are seen today. The nasal bone is present. No ultrasound evidence of markers for aneuploidy are seen. She understands that ultrasound exam cannot exclude genetic abnormalities and that genetic testing is recommended. The limitations of ultrasound were discussed.    See imaging tab for the complete US report.    DISCUSSION  During her visit we discussed and reviewed the following issues:  ADVANCED MATERNAL AGE    Background  I reviewed with the patient that pregnancies in women of advanced maternal age (35 or older at delivery) are associated with elevated risks. Specifically, there is a higher rate of:  Fetal malformations  Preeclampsia  Gestational diabetes  Intrauterine fetal death    As a result, enhanced pregnancy surveillance is advised for these patients including a comprehensive ultrasound to assess for fetal malformations (at 20 weeks) and a third trimester ultrasound assessment for fetal growth (at 32 weeks). In addition, weekly NST's (initiating at 36 weeks gestation for women 35-39 years and at 32 weeks gestation for women 40 years and older) are also advised. Routine obstetric care is more than adequate to assess for gestational diabetes and preeclampsia; hence, no further significant alterations in obstetric care are advised.    Medical Complications    Women 35  years of age or older can expect to experience two to three fold higher rates of hospitalization,  delivery, and pregnancy-related complications when compared to their younger counterparts.  The two most common medical problems complicating these  pregnanccies are hypertension and diabetes.   The incidence of preeclampsia in the general obstetric population is 3 to 4 percent; this increases to 5 to 10 percent in women over age 40 and is as high as 35 percent in women over age 50.   The incidence of gestational diabetes in the general obstetric population is 3 percent, rising to 7 to 12 percent in women over age 40 and 20 percent in women over age 50.  Women 35 years of age or older are more likely to be delivered by . The  delivery rate in the general obstetric population of the [place] is almost 30 percent, compared to almost 50 percent in women over age 40 to 45 and almost 80 percent in women age 50 to 63.          Fetal Death        A decision analysis tool using data from the [employer]  [employer] predicted a strategy of weekly antepartum testing and labor induction would lower the risk of unexplained fetal death in women 35 years of age or older. In this model, weekly testing starting at 36 weeks of gestation would drop the risk of fetal death from 5.2 to 1.3 per 1000 pregnancies. While a policy of antepartum testing in older women does increase the chance that a women will be induced (71 inductions per fetal death averted) and thereby increases her risk of having a  delivery, only 14 additional cesareans would need to be performed to avert one unexplained fetal death.  Hence, weekly NST's are advised for women of advanced maternal age; testing should be initiated at 36 weeks for women 35-39 years and at 32 weeks for women 40 years and older.    Fetal Malformations    Cardiac malformations, clubfoot, and diaphragmatic hernia appear to occur with increased  frequency in offspring of older women. These abnormalities are structural and unrelated to aneuploidy, thus they would not be detected by karyotype analysis.  For these reasons a complete, detailed ultrasound (level II) is advised even if the fetus has a normal karyotype.    Fetal Aneuploidy  We also discussed the increased risk of chromosomal abnormalities associated with advanced maternal age. I reviewed that an ultrasound examination cannot reliably exclude potential genetic abnormalities. Given that the patient will be 39 years old at the time of delivery I reviewed that her risk (at amniocentesis) of having a fetus with any chromosome abnormality is 1:60 and with trisomy 21 is 1: 110.    Invasive Testing  I offered invasive genetic testing (amniocentesis, chorionic villus sampling) after reviewing the diagnostic accuracy of these tests as well as the procedure associated loss rate (1:500 for genetic amniocentesis).    She ultimately does not desire invasive genetic testing.     Non-invasive Pregnancy Testing (NIPT) -we reviewed her low risk cell free DNA screening and its limitations.  We also discussed the role of the level 2 ultrasound and its limitations.    We discussed her low risk cell free DNA screen and her normal level II ultrasound today.  We discussed her option for amniocentesis but that the likelihood of finding a genetic concern is quite low after her other low risk evaluations.  She appropriately declined invasive prenatal genetic diagnostic testing.    Pre-diabetes - A1C 6.0% 11/2022  We reviewed that pregnancy results and insulin resistance as part of the normal physiologic changes of pregnancy.  With her history of prediabetes, she is more likely to develop gestational diabetes.  She has not had gestational diabetes in the past.  We reviewed healthy eating, exercise and proper sleep to help her body stay as sensitive to insulin as possible.  We discussed her schedule and she might be able to  add daily exercise in the mornings before her  goes off to work.  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    She likes weightlifting.  We discussed starting with low weights and higher reps.  We discussed avoiding direct abdominal work.  We also reviewed that her target should be to keep her total weight gain to less than 20 pounds.    We discussed the current recommendations for limited gestational weight gain in pregnancy for overweight and obese women.  The [employer] currently recommends that women keep gestational weight gain to between 8-18 lbs.  We discussed the role of mild to moderate exercise, healthy food choices and appropriate portions sized to help achieve this goal.  Excess weight gain is associated with higher rates of gestational diabetes, hypertensive complications, fetal macrosomia and delivery complications.  Women with weight loss or insufficient weight gain have higher rates of small for gestational age infants.    A recent study found that initiating moderate exercise in early pregnancy for obese gravidas significantly reduced the incidence of gestational diabetes, gestational hypertension,  deliveries and C-sections.  I encouraged Kal to begin moderate exercise such as walking or stationary bike in the pregnancy.      We discussed the recommended plan of care based on her  risk factors.  Kal had her their questions answered to her satisfaction.      IMPRESSION:  IUP at 20w0d  Normal level 2 ultrasound  Advanced maternal age, low risk cell free DNA screening.  Invasive testing appropriately declined  Pre-diabetes diagnosed 2022  First trimester medication exposure (retinal, superficial)    RECOMMENDATIONS:  Continue care with Dr. Molina  Follow-up growth & BPP ultrasound at 32 weeks.  Weekly NST's at 36 weeks.  She was advised to limit weight gain to less than 20 pounds  Early GDM screening    Total  time spent 55 minutes this calendar day which includes preparing to see the patient including chart review, obtaining and/or reviewing additional medical history, performing a physical exam and evaluation, documenting clinical information in the electronic medical record, independently interpreting results, counseling the patient, communicating results to the patient/family/caregiver and coordinating care.     Case discussed with patient who demonstrated understanding and agreement with plan.     Thank you for allowing me to participate in the care of this patient.  Please feel free to contact me with any questions.    Crystal Delgadillo MD  Maternal-Fetal Medicine       Note to patient and family:  The 21st Century Cures Act makes medical notes available to patients in the interest of transparency.  However, please be advised that this is a medical document.  It is intended as a peer to peer communication.  It is written in medical language and may contain abbreviations or verbiage that are technical and unfamiliar.  It may appear blunt or direct.  Medical documents are intended to carry relevant information, facts as evident, and the clinical opinion of the practitioner.

## 2024-01-12 ENCOUNTER — ULTRASOUND ENCOUNTER (OUTPATIENT)
Dept: PERINATAL CARE | Facility: HOSPITAL | Age: 39
End: 2024-01-12
Attending: OBSTETRICS & GYNECOLOGY
Payer: COMMERCIAL

## 2024-01-12 VITALS
HEART RATE: 92 BPM | BODY MASS INDEX: 29.16 KG/M2 | DIASTOLIC BLOOD PRESSURE: 64 MMHG | WEIGHT: 175 LBS | HEIGHT: 65 IN | SYSTOLIC BLOOD PRESSURE: 104 MMHG

## 2024-01-12 DIAGNOSIS — R73.03 PREDIABETES: ICD-10-CM

## 2024-01-12 DIAGNOSIS — O09.522 MULTIGRAVIDA OF ADVANCED MATERNAL AGE IN SECOND TRIMESTER: ICD-10-CM

## 2024-01-12 DIAGNOSIS — O09.522 MULTIGRAVIDA OF ADVANCED MATERNAL AGE IN SECOND TRIMESTER: Primary | ICD-10-CM

## 2024-01-12 DIAGNOSIS — O09.891 MEDICATION EXPOSURE DURING FIRST TRIMESTER OF PREGNANCY: ICD-10-CM

## 2024-01-12 PROCEDURE — 76811 OB US DETAILED SNGL FETUS: CPT | Performed by: OBSTETRICS & GYNECOLOGY

## 2024-01-13 DIAGNOSIS — Z34.82 ENCOUNTER FOR SUPERVISION OF OTHER NORMAL PREGNANCY IN SECOND TRIMESTER: ICD-10-CM

## 2024-01-13 DIAGNOSIS — R73.03 PREDIABETES: Primary | ICD-10-CM

## 2024-01-13 NOTE — PROGRESS NOTES
Patient seen by MFM.  Patient needs early 1 hour glucose tolerance test.  New order for 1 hour glucose tolerance is provided.  Please provide patient with instructions and information.  Thank you.

## 2024-01-15 ENCOUNTER — TELEPHONE (OUTPATIENT)
Dept: OBGYN CLINIC | Facility: CLINIC | Age: 39
End: 2024-01-15

## 2024-01-15 NOTE — TELEPHONE ENCOUNTER
Arlyn Molina MD (Physician)     Patient seen by DANIELA.  Patient needs early 1 hour glucose tolerance test.  New order for 1 hour glucose tolerance is provided.  Please provide patient with instructions and information.  Thank you.          Spoke to patient and reviewed provider message. Instructions reviewed; no further questions.

## 2024-01-17 ENCOUNTER — ROUTINE PRENATAL (OUTPATIENT)
Dept: OBGYN CLINIC | Facility: CLINIC | Age: 39
End: 2024-01-17
Payer: COMMERCIAL

## 2024-01-17 VITALS
WEIGHT: 177.19 LBS | BODY MASS INDEX: 29.52 KG/M2 | SYSTOLIC BLOOD PRESSURE: 112 MMHG | DIASTOLIC BLOOD PRESSURE: 60 MMHG | HEIGHT: 65 IN

## 2024-01-17 DIAGNOSIS — Z34.90 PRENATAL CARE, ANTEPARTUM: Primary | ICD-10-CM

## 2024-01-17 PROCEDURE — 3074F SYST BP LT 130 MM HG: CPT | Performed by: OBSTETRICS & GYNECOLOGY

## 2024-01-17 PROCEDURE — 3078F DIAST BP <80 MM HG: CPT | Performed by: OBSTETRICS & GYNECOLOGY

## 2024-01-17 PROCEDURE — 3008F BODY MASS INDEX DOCD: CPT | Performed by: OBSTETRICS & GYNECOLOGY

## 2024-01-17 NOTE — PROGRESS NOTES
Chief Complaint   Patient presents with    Prenatal Care     Michael      Routine prenatal visit. Patient without complaints. Good fetal movement.  Patient denies any bleeding, leaking fluid, cramping, or contractions.    Assessment/Plan:  20w5d doing well  Previous CS and  x 2- reviewed increased risk of rupture due to short interpregnancy interval.  TOLAC consent signed  Doing early glucola tomorrow.  Push 1 hr GTT and CBC back to 28 weeks.  Blood type O pos  Med exposure- Level 2 done.    Patient had MT 21, AFP and carrier.     Diagnoses and all orders for this visit:    Prenatal care, antepartum      Return in about 4 weeks (around 2024) for Routine Prenatal Visit.

## 2024-01-18 ENCOUNTER — LAB ENCOUNTER (OUTPATIENT)
Dept: LAB | Age: 39
End: 2024-01-18
Attending: OBSTETRICS & GYNECOLOGY
Payer: COMMERCIAL

## 2024-01-18 DIAGNOSIS — Z34.82 ENCOUNTER FOR SUPERVISION OF OTHER NORMAL PREGNANCY IN SECOND TRIMESTER: ICD-10-CM

## 2024-01-18 DIAGNOSIS — R73.03 PREDIABETES: ICD-10-CM

## 2024-01-18 LAB — GLUCOSE 1H P GLC SERPL-MCNC: 129 MG/DL

## 2024-01-18 PROCEDURE — 36415 COLL VENOUS BLD VENIPUNCTURE: CPT

## 2024-01-18 PROCEDURE — 82950 GLUCOSE TEST: CPT

## 2024-01-24 ENCOUNTER — TELEPHONE (OUTPATIENT)
Dept: OBGYN CLINIC | Facility: CLINIC | Age: 39
End: 2024-01-24

## 2024-01-24 NOTE — TELEPHONE ENCOUNTER
Pt states that starting three days ago she has had 3-4 bowel movements in a day. She is concerned because she typically has only one a day and would like to speak with a nurse since she is pregnant. Thank you

## 2024-01-25 NOTE — TELEPHONE ENCOUNTER
Patient states she has been having more frequent bowel movements (3-4 times per day).  Patient denies diarrhea, nausea, vomiting or stomach cramps.  Denies any changes in diet or water intake- although has consumed more salad the last few days.  Discussed that increased fiber intake can cause changes to bowel movements as well as pregnancy hormones in general.  Advised patient to continue to monitor and call if stool becomes diarrhea or if symptoms worsen.  Patient verbalized understanding.

## 2024-02-07 ENCOUNTER — ROUTINE PRENATAL (OUTPATIENT)
Dept: OBGYN CLINIC | Facility: CLINIC | Age: 39
End: 2024-02-07
Payer: COMMERCIAL

## 2024-02-07 VITALS
BODY MASS INDEX: 30 KG/M2 | DIASTOLIC BLOOD PRESSURE: 70 MMHG | WEIGHT: 180.13 LBS | HEART RATE: 109 BPM | SYSTOLIC BLOOD PRESSURE: 110 MMHG

## 2024-02-07 DIAGNOSIS — Z3A.23 23 WEEKS GESTATION OF PREGNANCY: Primary | ICD-10-CM

## 2024-02-07 NOTE — PROGRESS NOTES
Return OB Visit 20-27 WGA      GA: 23w5d  Vitals:    24 0925   BP: 110/70   Pulse: 109   Weight: 180 lb 2 oz (81.7 kg)       Doing well. Denies LOF/VB/uctx. +FM.   RH positive  S/P Anatomy scan w/ MFM  1 hr glucose and CBC (24-28wks) already ordered     Patient Active Problem List    Diagnosis    Medication exposure during first trimester of pregnancy     Retinol exposure during her first trimester  Also reports she took plan B prior to positive urine pregnancy test  [ ] MFM      Hx successful  (vaginal birth after ), currently pregnant     Hx of CS x1 2/2 breech, hx of  x2   Reviewed short interpregnancy interval.  [ x] consent form for TOLAC      Prediabetes     [X ] early 1 hr         Hypercholesteremia    AMA (advanced maternal age) multigravida 35+     NIPT low risk, msAFP wnl  [X] Level 2 US  Growth US 32 wks + BPP  weekly NST 36 wks              RTC q4wks        Note to patient and family   The  [employer] Cures Act makes medical notes available to patients in the interest of transparency.  However, please be advised that this is a medical document.  It is intended as [id number]-[id number] communication.  It is written and medical language may contain abbreviations or verbiage that are technical and unfamiliar.  It may appear blunt or direct.  Medical documents are intended to carry relevant information, facts as evident, and the clinical opinion of the practitioner.    Riccardo Giles MD

## 2024-02-23 ENCOUNTER — TELEPHONE (OUTPATIENT)
Dept: OBGYN CLINIC | Facility: CLINIC | Age: 39
End: 2024-02-23

## 2024-02-23 DIAGNOSIS — Z34.80 SUPERVISION OF OTHER NORMAL PREGNANCY, ANTEPARTUM (HCC): Primary | ICD-10-CM

## 2024-02-23 NOTE — TELEPHONE ENCOUNTER
Order placed per Dr. Molina note on Glucose test completed 1/18/2024.  Patient notified.  Patient verbalized understanding.

## 2024-02-26 ENCOUNTER — LAB ENCOUNTER (OUTPATIENT)
Dept: LAB | Facility: REFERENCE LAB | Age: 39
End: 2024-02-26
Attending: OBSTETRICS & GYNECOLOGY
Payer: COMMERCIAL

## 2024-02-26 DIAGNOSIS — Z34.80 SUPERVISION OF OTHER NORMAL PREGNANCY, ANTEPARTUM (HCC): ICD-10-CM

## 2024-02-26 LAB — GLUCOSE 1H P GLC SERPL-MCNC: 143 MG/DL

## 2024-02-26 PROCEDURE — 36415 COLL VENOUS BLD VENIPUNCTURE: CPT

## 2024-02-26 PROCEDURE — 82950 GLUCOSE TEST: CPT

## 2024-02-27 DIAGNOSIS — O99.810 ABNORMAL MATERNAL GLUCOSE TOLERANCE, ANTEPARTUM (HCC): Primary | ICD-10-CM

## 2024-02-27 NOTE — PROGRESS NOTES
Called and left a vm for pt. to call back and speak with nurse about test results and recommendations.

## 2024-02-27 NOTE — PROGRESS NOTES
Pt. called back and reviewed test results and recommendations from Dr. Molina..  Told her to be NPO for testing at least 8-10 hours before lab drawn .just water to drink  Pt. verbalizes understanding of info and agrees to plan and will get lab test done.

## 2024-02-29 ENCOUNTER — LABORATORY ENCOUNTER (OUTPATIENT)
Dept: LAB | Age: 39
End: 2024-02-29
Attending: OBSTETRICS & GYNECOLOGY
Payer: COMMERCIAL

## 2024-02-29 DIAGNOSIS — O99.810 ABNORMAL MATERNAL GLUCOSE TOLERANCE, ANTEPARTUM (HCC): ICD-10-CM

## 2024-02-29 LAB
GLUCOSE 1H P GLC SERPL-MCNC: 125 MG/DL
GLUCOSE 2H P GLC SERPL-MCNC: 168 MG/DL
GLUCOSE 3H P GLC SERPL-MCNC: 129 MG/DL (ref 70–140)
GLUCOSE P FAST SERPL-MCNC: 86 MG/DL

## 2024-02-29 PROCEDURE — 82952 GTT-ADDED SAMPLES: CPT

## 2024-02-29 PROCEDURE — 82951 GLUCOSE TOLERANCE TEST (GTT): CPT

## 2024-02-29 PROCEDURE — 36415 COLL VENOUS BLD VENIPUNCTURE: CPT

## 2024-03-06 ENCOUNTER — ROUTINE PRENATAL (OUTPATIENT)
Dept: OBGYN CLINIC | Facility: CLINIC | Age: 39
End: 2024-03-06
Payer: COMMERCIAL

## 2024-03-06 VITALS
HEART RATE: 126 BPM | SYSTOLIC BLOOD PRESSURE: 114 MMHG | WEIGHT: 184.19 LBS | BODY MASS INDEX: 31 KG/M2 | DIASTOLIC BLOOD PRESSURE: 74 MMHG

## 2024-03-06 DIAGNOSIS — O34.219 HX SUCCESSFUL VBAC (VAGINAL BIRTH AFTER CESAREAN), CURRENTLY PREGNANT (HCC): ICD-10-CM

## 2024-03-06 DIAGNOSIS — R73.03 PREDIABETES: ICD-10-CM

## 2024-03-06 DIAGNOSIS — Z34.80 SUPERVISION OF OTHER NORMAL PREGNANCY, ANTEPARTUM (HCC): Primary | ICD-10-CM

## 2024-03-06 DIAGNOSIS — O09.529 ANTEPARTUM MULTIGRAVIDA OF ADVANCED MATERNAL AGE (HCC): ICD-10-CM

## 2024-03-06 DIAGNOSIS — O09.891 MEDICATION EXPOSURE DURING FIRST TRIMESTER OF PREGNANCY (HCC): ICD-10-CM

## 2024-03-06 DIAGNOSIS — O99.810 ABNORMAL MATERNAL GLUCOSE TOLERANCE, ANTEPARTUM (HCC): ICD-10-CM

## 2024-03-06 DIAGNOSIS — Z23 NEED FOR VACCINATION: ICD-10-CM

## 2024-03-06 DIAGNOSIS — Z34.90 ENCOUNTER FOR SUPERVISION OF NORMAL PREGNANCY, ANTEPARTUM, UNSPECIFIED GRAVIDITY (HCC): ICD-10-CM

## 2024-03-06 NOTE — PATIENT INSTRUCTIONS
[email or web address]     Chiropractor care: complete chiropractic care [phone], Dr. Lori Yang        Tdap Vaccine: What You Need To Know    1.  Why Get Vaccinated:    Tetanus, diphtheria, and pertussis can be very serious diseases, even for adolescents and adults.  Tdap vaccine can protect us from these diseases.    Tetanus (Lockjaw) causes painful muscle tightening and stiffness, usually all over the body.  It can lead to tightening of muscles in the head and neck so you can’t open your mouth, swallow, or sometimes even breathe.  Tetanus kills about 1 out of 5 people who are infected.  Diphtheria can cause a thick coating to form in the back of the throat, which can lead to breathing problems, paralysis, heart failure, and death.  Pertussis (Whooping Cough) causes severe coughing spells, which can cause difficulty breathing, vomiting, and disturbed sleep.  It can also lead to weight loss, incontinence, and rib fractures.  Up to 2 in 100 adolescents and 5 in 100 adults with pertussis are hospitalized or have complications, which could include pneumonia or death.    These diseases are caused by bacteria.  Diphtheria and pertussis are spread from person to person through coughing or sneezing.  Tetanus enters the body through cuts, scratches, or wounds.    Before vaccines, the [place] saw as many as 200,000 cases a year of diphtheria and pertussis, and hundreds of cases of tetanus.  Since vaccination began, tetanus and diphtheria have dropped by about 99% and pertussis by about 80%.    2.  Tdap Vaccine in Pregnancy    Pregnant women should get a dose of Tdap during every pregnancy in the third trimester, to protect the  from pertussis.  Infants are most at risk for severe, life-threatening complications from pertussis.      A similar vaccine, called Td, protects from tetanus and diphtheria, but not pertussis.   A Td booster  should be given every 10 years.  Tdap may be given as one of these boosters if you have not already gotten a dose.  Tdap may also be given after a severe cut or burn to prevent tetanus infection.            Medications Safe in Pregnancy  The following over-the-counter medications may be taken safely after 12 weeks gestation by any patient who is pregnant.  Please follow the instructions on the package for adult dosage.  If you experience any symptoms prior to 12 weeks, please call the office at [phone].      Colds/Congestion: Flonase, Saline Nasal Spray, Neti Pot, Plain Robitussin, Robitussin DM, Mucinex DM, Vicks 44 E, Vicks Vapor rub, Cough drops without Zinc or Sudafed.   Contact your doctor if you have a persistent fever over 100.4, shortness of breath, coughing up green mucus, or a sore throat that persists from more than 3 days    Diarrhea: Imodium, Maalox Anti-Diarrheal or Kaopectate  Contact the office if you have diarrhea for more than 3 days.    Allergies: Benadryl, Claritin or Zyrtec    Hemorrhoids: Tucks pads, Preparation H, Annusol, Sitz bath or Witch hazel  Eat a high fiber diet and drink plenty of fluids.    Yeast: Monistat 3 or 7  Call if your symptoms do not improve, or if you experience vaginal bleeding, or a watery discharge.    Constipation: Metamucil, Colace, fiber supplement, Milk of Magnesia or Dulcolax  Drink plenty of fluids, eat high-fiber foods and take the above laxatives sporadically.     Headache or Mild aches and pain: Extra Strength Tylenol     Gas: Gas X, Gelusil, Papaya Tablets, Maalox, Mylicon or Mylanta Gas    Heartburn: Tums, Mylanta, Pepcid AC or Maalox    Sore throat: Alcohol free Chloraseptic spray or Lozenges     Nausea and Vomiting: ½ tablet Unisom plus 100mg of Vitamin B6 at bedtime (may increase B6 up to 200mg per day), Judy root tea or raspberry leaf tea    Insomnia: Tylenol PM, Vitamin B6 50mg, warm bath or milk, Unisom, Nytol or Sominex.     We have listed a few  medications for common symptoms seen in pregnancy.  Please contact the office if you are unsure about any over the counter medications that are not listed above.

## 2024-03-06 NOTE — PROGRESS NOTES
[facility]    GA: 27w5d  Vitals:    24 1440   BP: 114/74   Pulse: (!) 126   Weight: 184 lb 3.2 oz (83.6 kg)       Doing well, +FM  Denies LOF/VB/uctx  Rh +, TDAP d/w patient and ordered for next visit per patient request, EPDS 0  PTL and Fetal movement instructions given      Assessment:     Kal Cason is a 38 year old  at 27w5d who presents for routine OB visit. Overall doing well.     Problem List Items Addressed This Visit          Endocrine and Metabolic    Prediabetes    Abnormal maternal glucose tolerance, antepartum (HCC)       Gravid and     Supervision of other normal pregnancy, antepartum ([facility]) - Primary    AMA (advanced maternal age) multigravida 35+ ([facility])    Hx successful  (vaginal birth after ), currently pregnant ([facility])    Medication exposure during first trimester of pregnancy ([facility])     Other Visit Diagnoses       Encounter for supervision of normal pregnancy, antepartum, unspecified  ([facility])        Relevant Orders    HIV Ag/Ab Combo    T Pallidum Screening [place]    Need for vaccination        Relevant Orders    Immunization Admin Counseling, 1st Component, 18 years and older    TdaP (Boostrix/Adacel) Vaccine (> 7 Y)                Plan:     Patient Active Problem List    Diagnosis    Abnormal maternal glucose tolerance, antepartum ([facility])     [x ] 3 hr GTT      Medication exposure during first trimester of pregnancy ([facility])     Retinol exposure during her first trimester  Also reports she took plan B prior to positive urine pregnancy test  [ ] MFM      Hx successful  (vaginal birth after ), currently pregnant ([facility])     Hx of CS x1 2/2 breech, hx of  x2   Reviewed short interpregnancy interval.  [ x] consent form for TOLAC      Prediabetes     [X ] early 1 hr         Hypercholesteremia    AMA (advanced maternal age) multigravida 35+ (HCC)     NIPT low risk, msAFP wnl  [X] Level 2 US  Growth US 32 wks + BPP  weekly NST 36 wks         Supervision of other normal pregnancy, antepartum ([facility]CC)       - continue routine prenatal care   - labor and rupture of membrane precautions provided  -info for [employer] provided due hx of fetal malpresentation     Return to clinic in 2 weeks for [facility] visit         Note to patient and family   The 21st Century Cures Act makes medical notes available to patients in the interest of transparency.  However, please be advised that this is a medical document.  It is intended as [id number] communication.  It is written and medical language may contain abbreviations or verbiage that are technical and unfamiliar.  It may appear blunt or direct.  Medical documents are intended to carry relevant information, facts as evident, and the clinical opinion of the practitioner.

## 2024-03-11 ENCOUNTER — TELEPHONE (OUTPATIENT)
Dept: OBGYN CLINIC | Facility: CLINIC | Age: 39
End: 2024-03-11

## 2024-03-11 ENCOUNTER — ROUTINE PRENATAL (OUTPATIENT)
Dept: OBGYN CLINIC | Facility: CLINIC | Age: 39
End: 2024-03-11
Payer: COMMERCIAL

## 2024-03-11 VITALS
WEIGHT: 182.13 LBS | HEART RATE: 129 BPM | DIASTOLIC BLOOD PRESSURE: 72 MMHG | BODY MASS INDEX: 30 KG/M2 | SYSTOLIC BLOOD PRESSURE: 110 MMHG

## 2024-03-11 DIAGNOSIS — O09.529 ANTEPARTUM MULTIGRAVIDA OF ADVANCED MATERNAL AGE (HCC): ICD-10-CM

## 2024-03-11 DIAGNOSIS — O34.219 HX SUCCESSFUL VBAC (VAGINAL BIRTH AFTER CESAREAN), CURRENTLY PREGNANT (HCC): Primary | ICD-10-CM

## 2024-03-11 DIAGNOSIS — R51.9 NONINTRACTABLE HEADACHE, UNSPECIFIED CHRONICITY PATTERN, UNSPECIFIED HEADACHE TYPE: ICD-10-CM

## 2024-03-11 NOTE — TELEPHONE ENCOUNTER
Pt states she started getting a headache yesterday at 1pm and she hasn't been able to get relief since. She took over the counter medication and tried to rest but it persists. She wants to speak with a nurse to see what can be done as she is concerned. Thank you

## 2024-03-11 NOTE — TELEPHONE ENCOUNTER
Called and spoke with pt..     GA 28 weeks 3 days patient complaining of headache since yesterday  Last OV: 3/6/24 WAGNER Molina   Pregnancy Complications: AMA,  Abdominal pain: no  Leaking of fluid: no  Vaginal Bleeding: no  Fetal Movement: +      Spoke with pt..Has had headache since yesterday.Took tylenol 2 times, but only 1 pill each time, yesterday and today..  Has been eating and drinking fluids.Rates headache at \"5-6\"..has been trying to rest.  No hx. of migraines or any BP issues or pre-eclampsia in previous pregnancies.  Denies any kind of pre-eclampsia symptoms.    Was going to tell her to try magnesium supplement but forgot the dosing..    Please advise..  thanks

## 2024-03-11 NOTE — TELEPHONE ENCOUNTER
Left vm for pt. to call back and hopefully be able to come in today for appointment. With Baylee at 2pm for BP and FHT check.   General Sunscreen Counseling: I recommended a broad spectrum sunscreen with a SPF of 30 or higher.  I explained that SPF 30 sunscreens block approximately 97 percent of the sun's harmful rays.  Sunscreens should be applied at least 15 minutes prior to expected sun exposure and then every 2 hours after that as long as sun exposure continues. If swimming or exercising sunscreen should be reapplied every 45 minutes to an hour after getting wet or sweating.  One ounce, or the equivalent of a shot glass full of sunscreen, is adequate to protect the skin not covered by a bathing suit. I also recommended a lip balm with a sunscreen as well. Sun protective clothing can be used in lieu of sunscreen but must be worn the entire time you are exposed to the sun's rays. Detail Level: Detailed

## 2024-03-11 NOTE — PROGRESS NOTES
Her for a recent onset of a headache yesterday that has not gone away. It is over her frontal head and the bridge of her nose.    She had tried 2 doses of Tylenol, 1 tablet each time.    She denies any visual changes or epigastric pain.     She notes good fetal movement.    Advised she go home and rest, hydrate, take 2 tylenol and a Claritin.     She is to call with any worsening or persistence of the headache, new symptoms or symptoms per above.       Her resting heart rate is also noted to be elevated. She saw cardiology for palpitations. Her health km shows her heart rates persistently elevated the majority of the time. I advised she deliberately try to hydrate and take activities slowly. Call cardiology to inform them.    Keep upcoming OB appointment

## 2024-03-11 NOTE — TELEPHONE ENCOUNTER
Pt. called back and made appointment. for her to see Baylee at 2 pm for BP/FHT check at 2pm...Baylee aware..

## 2024-03-12 ENCOUNTER — TELEPHONE (OUTPATIENT)
Dept: OBGYN CLINIC | Facility: CLINIC | Age: 39
End: 2024-03-12

## 2024-03-12 DIAGNOSIS — R00.0 TACHYCARDIA: Primary | ICD-10-CM

## 2024-03-12 NOTE — TELEPHONE ENCOUNTER
Pt. went to see cards today and they are recommending  a CBC to be ordered.  Please review their note from pt's visit with them.  CBC order has been pended for you to sign.  Thanks    PS..her headache is getting better per pt.

## 2024-03-12 NOTE — TELEPHONE ENCOUNTER
Pt saw cardiologist today and they would like her to have a cbc test done. Please advise, thank you

## 2024-03-15 ENCOUNTER — LAB ENCOUNTER (OUTPATIENT)
Dept: LAB | Facility: REFERENCE LAB | Age: 39
End: 2024-03-15
Attending: NURSE PRACTITIONER
Payer: COMMERCIAL

## 2024-03-15 DIAGNOSIS — R00.0 TACHYCARDIA: ICD-10-CM

## 2024-03-15 DIAGNOSIS — Z34.90 ENCOUNTER FOR SUPERVISION OF NORMAL PREGNANCY, ANTEPARTUM, UNSPECIFIED GRAVIDITY (HCC): ICD-10-CM

## 2024-03-15 LAB
BASOPHILS # BLD AUTO: 0.02 X10(3) UL (ref 0–0.2)
BASOPHILS NFR BLD AUTO: 0.4 %
DEPRECATED RDW RBC AUTO: 33.2 FL (ref 35.1–46.3)
EOSINOPHIL # BLD AUTO: 0.05 X10(3) UL (ref 0–0.7)
EOSINOPHIL NFR BLD AUTO: 0.9 %
ERYTHROCYTE [DISTWIDTH] IN BLOOD BY AUTOMATED COUNT: 14.6 % (ref 11–15)
HCT VFR BLD AUTO: 33.1 %
HGB BLD-MCNC: 10.6 G/DL
IMM GRANULOCYTES # BLD AUTO: 0.06 X10(3) UL (ref 0–1)
IMM GRANULOCYTES NFR BLD: 1.1 %
LYMPHOCYTES # BLD AUTO: 1.21 X10(3) UL (ref 1–4)
LYMPHOCYTES NFR BLD AUTO: 21.2 %
MCH RBC QN AUTO: 20.6 PG (ref 26–34)
MCHC RBC AUTO-ENTMCNC: 32 G/DL (ref 31–37)
MCV RBC AUTO: 64.4 FL
MONOCYTES # BLD AUTO: 0.38 X10(3) UL (ref 0.1–1)
MONOCYTES NFR BLD AUTO: 6.7 %
NEUTROPHILS # BLD AUTO: 3.99 X10 (3) UL (ref 1.5–7.7)
NEUTROPHILS # BLD AUTO: 3.99 X10(3) UL (ref 1.5–7.7)
NEUTROPHILS NFR BLD AUTO: 69.7 %
PLATELET # BLD AUTO: 175 10(3)UL (ref 150–450)
RBC # BLD AUTO: 5.14 X10(6)UL
T PALLIDUM AB SER QL IA: NONREACTIVE
WBC # BLD AUTO: 5.7 X10(3) UL (ref 4–11)

## 2024-03-15 PROCEDURE — 36415 COLL VENOUS BLD VENIPUNCTURE: CPT

## 2024-03-15 PROCEDURE — 87389 HIV-1 AG W/HIV-1&-2 AB AG IA: CPT

## 2024-03-15 PROCEDURE — 85025 COMPLETE CBC W/AUTO DIFF WBC: CPT

## 2024-03-15 PROCEDURE — 86780 TREPONEMA PALLIDUM: CPT

## 2024-03-19 PROBLEM — O99.019 ANTEPARTUM ANEMIA (HCC): Status: ACTIVE | Noted: 2024-03-19

## 2024-03-20 ENCOUNTER — ROUTINE PRENATAL (OUTPATIENT)
Dept: OBGYN CLINIC | Facility: CLINIC | Age: 39
End: 2024-03-20
Payer: COMMERCIAL

## 2024-03-20 VITALS
SYSTOLIC BLOOD PRESSURE: 116 MMHG | HEART RATE: 103 BPM | BODY MASS INDEX: 30 KG/M2 | DIASTOLIC BLOOD PRESSURE: 72 MMHG | WEIGHT: 182.81 LBS

## 2024-03-20 DIAGNOSIS — O99.019 ANTEPARTUM ANEMIA (HCC): Primary | ICD-10-CM

## 2024-03-20 DIAGNOSIS — Z3A.29 29 WEEKS GESTATION OF PREGNANCY (HCC): ICD-10-CM

## 2024-03-20 PROBLEM — R00.0 TACHYCARDIA: Status: ACTIVE | Noted: 2024-03-20

## 2024-03-20 PROCEDURE — 90715 TDAP VACCINE 7 YRS/> IM: CPT | Performed by: STUDENT IN AN ORGANIZED HEALTH CARE EDUCATION/TRAINING PROGRAM

## 2024-03-20 PROCEDURE — 90471 IMMUNIZATION ADMIN: CPT | Performed by: STUDENT IN AN ORGANIZED HEALTH CARE EDUCATION/TRAINING PROGRAM

## 2024-03-20 NOTE — PROGRESS NOTES
Return OB Visit 28-33 WGA      GA: 29w5d  Vitals:    24 0745   BP: 116/72   Pulse: 103   Weight: 182 lb 12.8 oz (82.9 kg)       Doing well, +FM  Denies LOF/VB/uctx  Rh positive, TDAP received, EPDS Depression Scale Total: 0 (3/11/2024  2:04 PM)   PTL and Fetal movement instructions given  3rd T HIV NR      Patient Active Problem List    Diagnosis    Tachycardia     Saw cardiologist who recommended echo and holter.        Antepartum anemia ([facility])     Start daily Ferrous Sulfate 325 mg - recheck mid- April      Abnormal maternal glucose tolerance, antepartum ([facility])     [x ] 3 hr GTT      Medication exposure during first trimester of pregnancy ([facility])     Retinol exposure during her first trimester  Also reports she took plan B prior to positive urine pregnancy test  [ x] MFM      Hx successful  (vaginal birth after ), currently pregnant ([facility])     Hx of CS x1 2/2 breech, hx of  x2   Reviewed short interpregnancy interval.  [ x] consent form for TOLAC      Prediabetes     [X ] early 1 hr         Hypercholesteremia    AMA (advanced maternal age) multigravida 35+ ([facility])     NIPT low risk, msAFP wnl  [X] Level 2 US  Growth US 32 wks + BPP  weekly NST 36 wks        Supervision of other normal pregnancy, antepartum ([facility])         RTC in 2 wks      Note to patient and family   The  Cures Act makes medical notes available to patients in the interest of transparency.  However, please be advised that this is a medical document.  It is intended as [id number] communication.  It is written and medical language may contain abbreviations or verbiage that are technical and unfamiliar.  It may appear blunt or direct.  Medical documents are intended to carry relevant information, facts as evident, and the clinical opinion of the practitioner.      Riccardo Giles MD

## 2024-03-20 NOTE — PATIENT INSTRUCTIONS
KICK COUNT INSTRUCTIONS    After 28 weeks of pregnancy, we would like for you to monitor your baby’s movement daily by doing kick counts, an easy way for you to assess your baby’s well-being.    How to count kicks:  Choose a time when the baby is active, such as after a meal.  Sit comfortably or lie on your side, and count the number of movements in an hour… you should feel 10 movements in 2 hours    The evening hours seem to be the most convenient time to do this test, although you can also do this test anytime you are concerned about the baby’s movement.    Call the office right away at [phone] if you notice any of the following:  Your baby moves less than 10 times in 2 hours while you are doing kick counts.  Your baby moves much less often than on the days before.  You have not felt your baby move all day.

## 2024-03-20 NOTE — PROGRESS NOTES
Patient was seen for appointment today.  Office visit notes state that anemia was discussed and patient was advised to start iron supplement daily and repeat testing in mid- April.

## 2024-03-24 NOTE — PROGRESS NOTES
Outpatient Maternal-Fetal Medicine Follow-Up    Dear Dr. Knox    Thank you for requesting an ultrasound and maternal-fetal medicine consultation on your patient Kal Cason.  As you are aware she is a 38 year old female  with a james pregnancy and an Estimated Date of Delivery: 24.  She returned to maternal-fetal medicine today for a follow-up visit.  Her history was reviewed from her prior visit and there were no interval changes.    Antepartum Risk Factors  AMA: low-risk cell free fetal DNA, declined invasive testing  Pre-diabetes diagnosed 2022  First trimester medication exposure (retinal, superficial)    PHYSICAL EXAMINATION:  BP 99/67 (BP Location: Right arm, Patient Position: Sitting, Cuff Size: adult)   Pulse (!) 122   Ht 5' 5\" (1.651 m)   Wt 185 lb (83.9 kg)   LMP 2023   BMI 30.79 kg/m²   General: alert and oriented, no acute distress  Abdomen: gravid, soft, non-tender  Extremities: non-tender, no edema    OBSTETRIC ULTRASOUND  The patient had a follow-up growth ultrasound today which revealed normal interval fetal growth, BPP 8/8.    Ultrasound Findings:  Single IUP in cephalic presentation.    Placenta is posterior.   Cardiac activity is present at 151 bpm  EFW 1911 g ( 4 lb 3 oz); 49%.    BR[place]ON is  21 cm.  MVP is 6.5 cm  BPP is 8/8.     The fetal measurements are consistent with established EDC. No gross ultrasound evidence of structural abnormalities are seen today. The patient understands that ultrasound cannot rule out all structural and chromosomal abnormalities.     See Imaging Tab For Complete Ultrasound Report  I interpreted the results and reviewed them with the patient.    DISCUSSION  During her visit we discussed and reviewed the following issues:  ADVANCED MATERNAL AGE  See prior MFM notes for a detailed review.  She did not desire invasive genetic testing.   She has already obtained a low-risk NPIT result and was appropriately reassured.       IMPRESSION:  IUP at 32w0d  AMA: low-risk cell free fetal DNA, declined invasive testing  Pre-diabetes diagnosed November 2022  First trimester medication exposure (retinal, superficial)     RECOMMENDATIONS:  Continue care with Dr. Knox  Weekly NST's at 36 weeks.  She was advised to limit weight gain to less than 20 pounds    Thank you for allowing me to participate in the care of your patient.  Please do not hesitate to contact me if additional questions or concerns arise.      Zechariah Viramontes M.D.    20 minutes spent in review of records, patient consultation, documentation and coordination of care.  The relevant clinical matter(s) are summarized above.     Note to patient and family  The 21st Century Cures Act makes medical notes available to patients in the interest of transparency.  However, please be advised that this is a medical document.  It is intended as txel-ub-ciiz communication.  It is written and medical language may contain abbreviations or verbiage that are technical and unfamiliar.  It may appear blunt or direct.  Medical documents are intended to carry relevant information, facts as evident, and the clinical opinion of the practitioner.

## 2024-03-29 ENCOUNTER — TELEPHONE (OUTPATIENT)
Dept: OBGYN CLINIC | Facility: CLINIC | Age: 39
End: 2024-03-29

## 2024-03-29 NOTE — TELEPHONE ENCOUNTER
Patient notified that she will have to return to the lab to complete the additional testing.  Patient verbalized understanding.

## 2024-03-29 NOTE — TELEPHONE ENCOUNTER
Pt called, states that she was told by Dr. Giles to get additioal blood work ordered on 03/20 during her OV. She states Dr. SANTAMARIA told her she did not have to go to lab since lab had previously drawn her blood and could test that. Did make pt aware that orders are still in her chart and that she might need to go in lab and get it done. Can you please clarify if this pt is to go to lab? Please call back and advise. ty

## 2024-04-01 ENCOUNTER — LAB ENCOUNTER (OUTPATIENT)
Dept: LAB | Age: 39
End: 2024-04-01
Attending: STUDENT IN AN ORGANIZED HEALTH CARE EDUCATION/TRAINING PROGRAM
Payer: COMMERCIAL

## 2024-04-01 DIAGNOSIS — O99.019 ANTEPARTUM ANEMIA (HCC): ICD-10-CM

## 2024-04-01 LAB
DEPRECATED HBV CORE AB SER IA-ACNC: 11.9 NG/ML
T PALLIDUM AB SER QL IA: NONREACTIVE

## 2024-04-01 PROCEDURE — 36415 COLL VENOUS BLD VENIPUNCTURE: CPT

## 2024-04-01 PROCEDURE — 86780 TREPONEMA PALLIDUM: CPT

## 2024-04-01 PROCEDURE — 82728 ASSAY OF FERRITIN: CPT

## 2024-04-05 ENCOUNTER — ULTRASOUND ENCOUNTER (OUTPATIENT)
Dept: PERINATAL CARE | Facility: HOSPITAL | Age: 39
End: 2024-04-05
Attending: OBSTETRICS & GYNECOLOGY
Payer: COMMERCIAL

## 2024-04-05 ENCOUNTER — ROUTINE PRENATAL (OUTPATIENT)
Dept: OBGYN CLINIC | Facility: CLINIC | Age: 39
End: 2024-04-05
Payer: COMMERCIAL

## 2024-04-05 VITALS
WEIGHT: 184.5 LBS | SYSTOLIC BLOOD PRESSURE: 112 MMHG | HEART RATE: 113 BPM | DIASTOLIC BLOOD PRESSURE: 72 MMHG | BODY MASS INDEX: 31 KG/M2

## 2024-04-05 VITALS
HEIGHT: 65 IN | DIASTOLIC BLOOD PRESSURE: 67 MMHG | HEART RATE: 122 BPM | BODY MASS INDEX: 30.82 KG/M2 | SYSTOLIC BLOOD PRESSURE: 99 MMHG | WEIGHT: 185 LBS

## 2024-04-05 DIAGNOSIS — O09.529 AMA (ADVANCED MATERNAL AGE) MULTIGRAVIDA 35+ (HCC): ICD-10-CM

## 2024-04-05 DIAGNOSIS — O09.891 MEDICATION EXPOSURE DURING FIRST TRIMESTER OF PREGNANCY (HCC): ICD-10-CM

## 2024-04-05 DIAGNOSIS — O99.019 ANTEPARTUM ANEMIA (HCC): Primary | ICD-10-CM

## 2024-04-05 DIAGNOSIS — O09.529 AMA (ADVANCED MATERNAL AGE) MULTIGRAVIDA 35+ (HCC): Primary | ICD-10-CM

## 2024-04-05 DIAGNOSIS — O09.529 ANTEPARTUM MULTIGRAVIDA OF ADVANCED MATERNAL AGE (HCC): Primary | ICD-10-CM

## 2024-04-05 DIAGNOSIS — O09.523 MULTIGRAVIDA OF ADVANCED MATERNAL AGE IN THIRD TRIMESTER (HCC): ICD-10-CM

## 2024-04-05 PROCEDURE — 76816 OB US FOLLOW-UP PER FETUS: CPT | Performed by: OBSTETRICS & GYNECOLOGY

## 2024-04-05 PROCEDURE — 76819 FETAL BIOPHYS PROFIL W/O NST: CPT

## 2024-04-05 RX ORDER — MELATONIN
325
COMMUNITY

## 2024-04-05 NOTE — PROGRESS NOTES
32w0d seen for routine OB visit.   Denies ctx, lof, vb. Reports good FM.    Patient Active Problem List   Diagnosis    Supervision of other normal pregnancy, antepartum ([facility])    AMA (advanced maternal age) multigravida 35+ ([facility])    Prediabetes    Hypercholesteremia    Hx successful  (vaginal birth after ), currently pregnant ([facility])    Medication exposure during first trimester of pregnancy ([facility])    Abnormal maternal glucose tolerance, antepartum ([facility])    Antepartum anemia ([facility])    Tachycardia        TW lb 8 oz (8.845 kg)   Depression Scale Total: 0 (3/11/2024  2:04 PM)      Gen: AAOx3, NAD  Resp: breathing comfortably  Abdomen: gravid, soft, nontender  Ext: nontender, no edema    Plan:  - growth US with MFM today - appropriate growth and BPP  - weekly NST at 36wks  - 3T labs neg - repeat CBC ordered  - s/p tdap  - interested in scheduling 40wk IOL - strongly desires another , good candidate with history. Will confer with group and consider scheduling 39-40wk IOL.  - return precautions reviewed    RTO in 2 week(s).

## 2024-04-08 ENCOUNTER — TELEPHONE (OUTPATIENT)
Dept: OBGYN CLINIC | Facility: CLINIC | Age: 39
End: 2024-04-08

## 2024-04-08 NOTE — TELEPHONE ENCOUNTER
----- Message from Felipa Knox MD sent at 2024  9:26 AM CDT -----  Regarding: IOL   IOL request:  Request IOL at/between this GA: 40wks (between - would be best)  Estimated Date of Delivery: 24   Indication for IOL:  AMA  Time of appointment: am  Cervical ripening with cytotec: no  IOL with pitocin: yes, per protocol  OB history/complications: history of prior ,  x2

## 2024-04-15 ENCOUNTER — PATIENT MESSAGE (OUTPATIENT)
Dept: OBGYN CLINIC | Facility: CLINIC | Age: 39
End: 2024-04-15

## 2024-04-16 ENCOUNTER — TELEPHONE (OUTPATIENT)
Dept: OBGYN CLINIC | Facility: CLINIC | Age: 39
End: 2024-04-16

## 2024-04-16 NOTE — TELEPHONE ENCOUNTER
Patient has a CBC lab order and she is asking if she should take her iron pill before the lab draw. Please call to advise

## 2024-04-17 NOTE — TELEPHONE ENCOUNTER
From: Kal Cason  To: Felipa Knox  Sent: 4/15/2024 5:20 PM CDT  Subject: CBC platelet test     Hi Dr Knox,     I was wondering if I should take my iron supplement before this test or not. Thanks for letting me know.

## 2024-04-20 ENCOUNTER — LAB ENCOUNTER (OUTPATIENT)
Dept: LAB | Facility: REFERENCE LAB | Age: 39
End: 2024-04-20
Attending: OBSTETRICS & GYNECOLOGY
Payer: COMMERCIAL

## 2024-04-20 DIAGNOSIS — O99.019 ANTEPARTUM ANEMIA (HCC): ICD-10-CM

## 2024-04-20 LAB
DEPRECATED RDW RBC AUTO: 40.4 FL (ref 35.1–46.3)
ERYTHROCYTE [DISTWIDTH] IN BLOOD BY AUTOMATED COUNT: 17.5 % (ref 11–15)
HCT VFR BLD AUTO: 36.7 %
HGB BLD-MCNC: 11.2 G/DL
MCH RBC QN AUTO: 20.6 PG (ref 26–34)
MCHC RBC AUTO-ENTMCNC: 30.5 G/DL (ref 31–37)
MCV RBC AUTO: 67.5 FL
PLATELET # BLD AUTO: 177 10(3)UL (ref 150–450)
PLATELETS.RETICULATED NFR BLD AUTO: 11.5 % (ref 0–7)
RBC # BLD AUTO: 5.44 X10(6)UL
WBC # BLD AUTO: 9.4 X10(3) UL (ref 4–11)

## 2024-04-20 PROCEDURE — 36415 COLL VENOUS BLD VENIPUNCTURE: CPT

## 2024-04-20 PROCEDURE — 85027 COMPLETE CBC AUTOMATED: CPT

## 2024-04-24 ENCOUNTER — APPOINTMENT (OUTPATIENT)
Dept: OBGYN CLINIC | Facility: CLINIC | Age: 39
End: 2024-04-24
Payer: COMMERCIAL

## 2024-04-24 ENCOUNTER — ROUTINE PRENATAL (OUTPATIENT)
Dept: OBGYN CLINIC | Facility: CLINIC | Age: 39
End: 2024-04-24
Payer: COMMERCIAL

## 2024-04-24 VITALS
HEART RATE: 111 BPM | WEIGHT: 186.38 LBS | DIASTOLIC BLOOD PRESSURE: 72 MMHG | SYSTOLIC BLOOD PRESSURE: 114 MMHG | BODY MASS INDEX: 31 KG/M2

## 2024-04-24 DIAGNOSIS — Z3A.34 34 WEEKS GESTATION OF PREGNANCY (HCC): Primary | ICD-10-CM

## 2024-04-24 PROCEDURE — 59025 FETAL NON-STRESS TEST: CPT | Performed by: STUDENT IN AN ORGANIZED HEALTH CARE EDUCATION/TRAINING PROGRAM

## 2024-04-24 NOTE — PROGRESS NOTES
Return OB Visit 28-33 WGA      GA: 34w5d  Vitals:    24 0919   BP: 114/72   Pulse: 111   Weight: 186 lb 6 oz (84.5 kg)       Doing well, +FM  Denies LOF/VB/uctx  Rh positive, TDAP 3/20/2024 received, EPDS Depression Scale Total: 0 (3/11/2024  2:04 PM)   PTL and Fetal movement instructions given  3rd T HIV NR      Patient Active Problem List    Diagnosis    Tachycardia     Saw cardiologist who recommended echo and holter.        Antepartum anemia ([facility])     Start daily Ferrous Sulfate 325 mg - recheck mid- April      Abnormal maternal glucose tolerance, antepartum ([facility])     [x ] 3 hr GTT      Medication exposure during first trimester of pregnancy ([facility])     Retinol exposure during her first trimester  Also reports she took plan B prior to positive urine pregnancy test  [ x] MFM      Hx successful  (vaginal birth after ), currently pregnant ([facility])     Hx of CS x1 2/2 breech, hx of  x2   [ x] consent form for TOLAC      Prediabetes     [X ] early 1 hr         Hypercholesteremia    AMA (advanced maternal age) multigravida 35+ ([facility])     NIPT low risk, msAFP wnl  [X] Level 2 US  Growth US 32 wks + BPP - EFW 49% and BPP 8/8  weekly NST 36 wks        Supervision of other normal pregnancy, antepartum ([facility])         RTC in 2 wks      Note to patient and family   The  [place] Cures Act makes medical notes available to patients in the interest of transparency.  However, please be advised that this is a medical document.  It is intended as lhms-gl-fkmb communication.  It is written and medical language may contain abbreviations or verbiage that are technical and unfamiliar.  It may appear blunt or direct.  Medical documents are intended to carry relevant information, facts as evident, and the clinical opinion of the practitioner.      Riccardo Giles MD

## 2024-04-24 NOTE — PATIENT INSTRUCTIONS
KICK COUNT INSTRUCTIONS    After 28 weeks of pregnancy, we would like for you to monitor your baby’s movement daily by doing kick counts, an easy way for you to assess your baby’s well-being.    How to count kicks:  Choose a time when the baby is active, such as after a meal.  Sit comfortably or lie on your side, and count the number of movements in an hour… you should feel 10 movements in 2 hours    The evening hours seem to be the most convenient time to do this test, although you can also do this test anytime you are concerned about the baby’s movement.    Call the office right away at [phone] if you notice any of the following:  Your baby moves less than 10 times in 2 hours while you are doing kick counts.  Your baby moves much less often than on the days before.  You have not felt your baby move all day.

## 2024-05-01 ENCOUNTER — ROUTINE PRENATAL (OUTPATIENT)
Dept: OBGYN CLINIC | Facility: CLINIC | Age: 39
End: 2024-05-01
Payer: COMMERCIAL

## 2024-05-01 ENCOUNTER — APPOINTMENT (OUTPATIENT)
Dept: OBGYN CLINIC | Facility: CLINIC | Age: 39
End: 2024-05-01
Payer: COMMERCIAL

## 2024-05-01 VITALS
BODY MASS INDEX: 32 KG/M2 | SYSTOLIC BLOOD PRESSURE: 110 MMHG | HEART RATE: 94 BPM | WEIGHT: 190.13 LBS | DIASTOLIC BLOOD PRESSURE: 72 MMHG

## 2024-05-01 DIAGNOSIS — O09.529 ANTEPARTUM MULTIGRAVIDA OF ADVANCED MATERNAL AGE (HCC): Primary | ICD-10-CM

## 2024-05-01 DIAGNOSIS — Z34.80 SUPERVISION OF OTHER NORMAL PREGNANCY, ANTEPARTUM (HCC): ICD-10-CM

## 2024-05-01 PROCEDURE — 59025 FETAL NON-STRESS TEST: CPT | Performed by: NURSE PRACTITIONER

## 2024-05-01 NOTE — PROGRESS NOTES
WAGNER  Doing well, +FM  Denies VB/LOF/uctx  Mode of delivery:  anticipated  Labor precautions discussed    Discussed normal fetal movement and reviewed kick counts. She notes she is very busy with 3 kids and has a hard time paying attention to fetal movement. She is so tired when she goes to bed she falls asleep before she can pay attention. I emphasized the need to routinely check in an do kick counts every 2 hours if she is unable to note movement in this busy time otherwise. She is to call with any concerns about decreased fetal movement or if baby has a poor response to kick counts. She notes when she is able to focus baby always moves at least 10 times every 1-2 hours.    RTC 1 week  with NST/ GBS  NST was reactive

## 2024-05-07 ENCOUNTER — ROUTINE PRENATAL (OUTPATIENT)
Dept: OBGYN CLINIC | Facility: CLINIC | Age: 39
End: 2024-05-07
Payer: COMMERCIAL

## 2024-05-07 ENCOUNTER — APPOINTMENT (OUTPATIENT)
Dept: OBGYN CLINIC | Facility: CLINIC | Age: 39
End: 2024-05-07
Payer: COMMERCIAL

## 2024-05-07 VITALS
SYSTOLIC BLOOD PRESSURE: 112 MMHG | WEIGHT: 189.25 LBS | BODY MASS INDEX: 31 KG/M2 | DIASTOLIC BLOOD PRESSURE: 72 MMHG | HEART RATE: 93 BPM

## 2024-05-07 DIAGNOSIS — O99.019 ANTEPARTUM ANEMIA (HCC): ICD-10-CM

## 2024-05-07 DIAGNOSIS — O34.219 HX SUCCESSFUL VBAC (VAGINAL BIRTH AFTER CESAREAN), CURRENTLY PREGNANT (HCC): ICD-10-CM

## 2024-05-07 DIAGNOSIS — O09.529 ANTEPARTUM MULTIGRAVIDA OF ADVANCED MATERNAL AGE (HCC): ICD-10-CM

## 2024-05-07 DIAGNOSIS — R73.03 PREDIABETES: ICD-10-CM

## 2024-05-07 DIAGNOSIS — O99.810 ABNORMAL MATERNAL GLUCOSE TOLERANCE, ANTEPARTUM (HCC): ICD-10-CM

## 2024-05-07 DIAGNOSIS — O09.891 MEDICATION EXPOSURE DURING FIRST TRIMESTER OF PREGNANCY (HCC): ICD-10-CM

## 2024-05-07 DIAGNOSIS — Z34.90 ENCOUNTER FOR SUPERVISION OF NORMAL PREGNANCY, ANTEPARTUM, UNSPECIFIED GRAVIDITY (HCC): ICD-10-CM

## 2024-05-07 DIAGNOSIS — Z34.80 SUPERVISION OF OTHER NORMAL PREGNANCY, ANTEPARTUM (HCC): Primary | ICD-10-CM

## 2024-05-07 PROCEDURE — 87150 DNA/RNA AMPLIFIED PROBE: CPT | Performed by: OBSTETRICS & GYNECOLOGY

## 2024-05-07 PROCEDURE — 59025 FETAL NON-STRESS TEST: CPT | Performed by: OBSTETRICS & GYNECOLOGY

## 2024-05-07 PROCEDURE — 87081 CULTURE SCREEN ONLY: CPT | Performed by: OBSTETRICS & GYNECOLOGY

## 2024-05-07 PROCEDURE — 87147 CULTURE TYPE IMMUNOLOGIC: CPT | Performed by: OBSTETRICS & GYNECOLOGY

## 2024-05-07 PROCEDURE — 87184 SC STD DISK METHOD PER PLATE: CPT | Performed by: OBSTETRICS & GYNECOLOGY

## 2024-05-07 NOTE — PATIENT INSTRUCTIONS
Labor Instructions    How do I know if it’s true labor?  One of the most important aspects of any pregnancy is being able to recognize the onset of labor.  Unfortunately, on occasion it can be difficult or confusing, especially if you have had one or more children.  Each labor can begin in a different way even if you have had four or five children.    If this is your first child, it is very common to have labor for an average greater than 10 hours; however, there have been rare instances for labor to be two hours or less for a first time mother. It is more important for you to know if this is your second or third baby to realize that any labor after the first is usually shorter.  There is no way to tell how long or short the labor will be. Therefore, please call us if you are unsure labor has started.       Usually, during the last six weeks of pregnancy, [place] contractions or “false labor pains occur”.  False labor is generally not very painful though it is not always easy to tell.  You may feel contractions, cramps or uterine tightening somewhere between every 3-30 minutes but they will not continue to get stronger over time.  If you lie down, drink plenty of fluid or walk around, the contractions may go away.   False contractions are very common if you have been active on your feet for several hours.   Women frequently worry about being sent home from the hospital without having their baby (i.e. the labor stopped).  Actually, this is an unnecessary worry, for this is an infrequent occurrence.     True labor usually begins in one of two ways.  In most patients it begins with contractions of the uterus, which are irregular (but not always) in the beginning.  They are cramp-like in character and feel similar to menstrual cramps.  After a while, they become more regular, and they seem to last a little longer, and feel a little sharper.  These symptoms are very important-more important- than the timing of the  contractions.  Having regular (usually closer), longer lasting (35-70 seconds), and sharper (more painful) contractions are the common symptoms of actual labor.  The second way in which labor can begin which occurs in approximately 30% of all patients is the rupture of the bag of water.  This is a sudden gush of watery fluid, usually sufficient to run down your leg and onto the floor, or you may wet a large area of the bed if it happens at night.  There may also be tricking that is uncontrolled.  If you are unsure, please call the office.      When should I call?  When contractions are strong and every 3-5 minutes.  If you have a gush of water or you think you might be leaking fluid.  If you are bleeding heavily.  If your baby is not moving around at least every 1-2 hours.  If you are worried about something.  When you think you are ready to go to the hospital.    Who do I call?  Call the office at [phone].  If the office is closed, the answering service will send a message to the physician on call.  The on call physician will be available for emergency phone calls only.          Can I eat in labor?  It is good to eat a light meal at home before going to the hospital.  Eat foods like crackers, popsicles, soup and fruit.  Avoid foods that are difficult to digest like meat, a lot of dairy products and high fat foods.  DO NOT EAT if you know you are scheduled for a  ().      What will happen when I get to the hospital?  When you arrive at the hospital, you will be admitted and examined.   There are a few factors that will determine if you will be allowed to be up out of bed or if you would need to stay in bed.  The main factors are how well the baby is entering into the pelvis and if the bag of bella is in intact or ruptured.  An intravenous (IV) solution will, with exceptions, be started.  This is extremely important especially for the baby.   Your  will be allowed in the room during your  labor. During the delivery, the nurses will inform you of the hospital policy and how many coaches are allowed.  You may desire pain medication or anesthesia for pain.  You probably discussed some aspects of pain medication with us during your prenatal care.  The various options may also have been discussed in Prenatal Classes.  We utilize IV narcotics and epidural anesthesia when our patients request to have them.  If you chose to have no anesthesia, none will be administered.  A local anesthetic may be used at the time of delivery      What should I to bring to the hospital?  Maternity clothes to go home in  You can bring your own night gown to wear after giving birth, but most women prefer to wear the hospital gown because it may get soiled  Nursing Bra if you are planning to breastfeed  Clothes for your baby to go home in  Baby Car Seat.  Be sure you know how to install it correctly. Please install it before going to the hospital  Routine toiletries like toothbrush, shampoo, hairbrush and etc.   You can bring your favorite pillow, but please put a colored pillow case on it so it doesn’t get mixed up with hospital pillows    How long will I stay in the hospital?  The date you leave the hospital may vary depending on the speed of your recovery.  If you have a vaginal delivery, you will stay in the hospital 24-48 hours after your delivery as long as you aren’t having any complications.    If you have had a , you will stay in the hospital 48-72 hours as long as you aren’t having any complications.       Going Home Instructions  There are no set rules as to what you may do each day or week after you are home.  You will receive discharge instructions to help you each day.  Remember, early ambulation in the hospital is to prevent complications.  Do not let this lull you into a false sense of strength or ability to do certain physical acts which may tire you excessively.  Please call the office within a few days  after you are discharged from the hospital to schedule your post-partum visit, which is usually 4-6 weeks after delivery.    Any medications necessary will be discussed on an individual basis.  If you decide to breastfeed your baby, you should continue your prenatal vitamins.  If you do not breastfeed, simply finish the prenatal vitamins you have.      The staff at [facility] wish you a joyous and exciting birth.  If there is anything we can do to make this a better experience for you please do not hesitate to ask.

## 2024-05-07 NOTE — PROGRESS NOTES
WAGNER    GA: 36w4d  Vitals:    24 1115   BP: 112/72   Pulse: 93   Weight: 189 lb 4 oz (85.8 kg)       Doing well, +FM  Denies LOF/VB/uctx  SVE /-3     Assessment:     Kal Cason is a 38 year old  at 36w4d who presents for routine OB visit. Overall doing well.     Problem List Items Addressed This Visit          Endocrine and Metabolic    Prediabetes    Abnormal maternal glucose tolerance, antepartum ([facility])       Gravid and     Supervision of other normal pregnancy, antepartum ([facility]) - Primary    AMA (advanced maternal age) multigravida 35+ ([facility])    Relevant Orders    Fetal Non Stress Test [39244] (Completed)    Hx successful  (vaginal birth after ), currently pregnant ([facility])    Medication exposure during first trimester of pregnancy ([facility])       Hematology and Neoplasia    Antepartum anemia ([facility])     Other Visit Diagnoses       Encounter for supervision of normal pregnancy, antepartum, unspecified  ([facility])        Relevant Orders    Group B Strep PCR                Plan:     Patient Active Problem List    Diagnosis    Tachycardia     Saw cardiologist who recommended echo and holter.        Antepartum anemia ([facility])     Start daily Ferrous Sulfate 325 mg - recheck mid- April      Abnormal maternal glucose tolerance, antepartum ([facility])     [x ] 3 hr GTT      Medication exposure during first trimester of pregnancy ([facility])     Retinol exposure during her first trimester  Also reports she took plan B prior to positive urine pregnancy test  [ x] MFM      Hx successful  (vaginal birth after ), currently pregnant ([facility])     Hx of CS x1 2/2 breech, hx of  x2   [ x] consent form for TOLAC      Prediabetes     [X ] early 1 hr         Hypercholesteremia    AMA (advanced maternal age) multigravida 35+ ([facility])     NIPT low risk, msAFP wnl  [X] Level 2 US  Growth US 32 wks + BPP - EFW 49% and BPP 8/8  weekly NST 36 wks        Supervision of other normal pregnancy,  antepartum (HCC)       - continue routine prenatal care   - labor and rupture of membrane precautions provided  GBS collected   Fetal movement count given  Labor precautions provided       Return to clinic in 1 week for WAGNER visit     w/ NST. NST reactive and reassuring           Note to patient and family   The 21st Century Cures Act makes medical notes available to patients in the interest of transparency.  However, please be advised that this is a medical document.  It is intended as [id number] communication.  It is written and medical language may contain abbreviations or verbiage that are technical and unfamiliar.  It may appear blunt or direct.  Medical documents are intended to carry relevant information, facts as evident, and the clinical opinion of the practitioner.

## 2024-05-08 LAB — GROUP B STREP BY PCR FOR PCR OVT: POSITIVE

## 2024-05-10 PROBLEM — O99.820 GBS (GROUP B STREPTOCOCCUS CARRIER), +RV CULTURE, CURRENTLY PREGNANT (HCC): Status: ACTIVE | Noted: 2024-05-10

## 2024-05-14 ENCOUNTER — APPOINTMENT (OUTPATIENT)
Dept: OBGYN CLINIC | Facility: CLINIC | Age: 39
End: 2024-05-14

## 2024-05-14 ENCOUNTER — APPOINTMENT (OUTPATIENT)
Dept: ULTRASOUND IMAGING | Facility: HOSPITAL | Age: 39
End: 2024-05-14
Attending: OBSTETRICS & GYNECOLOGY

## 2024-05-14 ENCOUNTER — TELEPHONE (OUTPATIENT)
Dept: OBGYN UNIT | Facility: HOSPITAL | Age: 39
End: 2024-05-14

## 2024-05-14 ENCOUNTER — ROUTINE PRENATAL (OUTPATIENT)
Dept: OBGYN CLINIC | Facility: CLINIC | Age: 39
End: 2024-05-14

## 2024-05-14 ENCOUNTER — HOSPITAL ENCOUNTER (OUTPATIENT)
Facility: HOSPITAL | Age: 39
Discharge: HOME | End: 2024-05-14
Attending: OBSTETRICS & GYNECOLOGY | Admitting: OBSTETRICS & GYNECOLOGY

## 2024-05-14 VITALS — SYSTOLIC BLOOD PRESSURE: 122 MMHG | DIASTOLIC BLOOD PRESSURE: 70 MMHG | HEART RATE: 92 BPM | TEMPERATURE: 98 F

## 2024-05-14 VITALS
HEART RATE: 94 BPM | SYSTOLIC BLOOD PRESSURE: 108 MMHG | DIASTOLIC BLOOD PRESSURE: 62 MMHG | BODY MASS INDEX: 32 KG/M2 | WEIGHT: 190.13 LBS

## 2024-05-14 DIAGNOSIS — O09.523 MULTIGRAVIDA OF ADVANCED MATERNAL AGE IN THIRD TRIMESTER (HCC): ICD-10-CM

## 2024-05-14 DIAGNOSIS — Z34.80 SUPERVISION OF OTHER NORMAL PREGNANCY, ANTEPARTUM (HCC): Primary | ICD-10-CM

## 2024-05-14 PROCEDURE — 59025 FETAL NON-STRESS TEST: CPT

## 2024-05-14 PROCEDURE — 99213 OFFICE O/P EST LOW 20 MIN: CPT

## 2024-05-14 PROCEDURE — 76819 FETAL BIOPHYS PROFIL W/O NST: CPT | Performed by: OBSTETRICS & GYNECOLOGY

## 2024-05-14 PROCEDURE — 76818 FETAL BIOPHYS PROFILE W/NST: CPT | Performed by: OBSTETRICS & GYNECOLOGY

## 2024-05-14 PROCEDURE — 59025 FETAL NON-STRESS TEST: CPT | Performed by: OBSTETRICS & GYNECOLOGY

## 2024-05-14 NOTE — NST
Nonstress Test   Patient: Kal Cason    Gestation: 37w4d    NST:       Variability: Moderate           Accelerations: Yes           Decelerations: None            Baseline: 145 BPM           Uterine Irritability: No                                                    Acoustic Stimulator: No           Nonstress Test Interpretation: Reactive                                 Additional Comments

## 2024-05-14 NOTE — PROGRESS NOTES
37w4d seen for routine OB visit.   Denies ctx, lof, vb. Reports good FM.    Patient Active Problem List   Diagnosis    Supervision of other normal pregnancy, antepartum ([facility])    AMA (advanced maternal age) multigravida 35+ ([facility])    Prediabetes    Hypercholesteremia    Hx successful  (vaginal birth after ), currently pregnant ([facility])    Medication exposure during first trimester of pregnancy ([facility])    Abnormal maternal glucose tolerance, antepartum ([facility])    Antepartum anemia ([facility])    Tachycardia    GBS (group B Streptococcus carrier), +RV culture, currently pregnant ([facility])        TW lb 2 oz (11.4 kg)   Depression Scale Total: 0 (2024 10:42 AM)      Gen: AAOx3, NAD  Resp: breathing comfortably  Abdomen: gravid, soft, nontender  Ext: nontender, no edema    NST: difficult to establish baseline, moderate variability, apparent 15x15 accels, possible variable decels  [place]: rare ctx      Plan:  - indeterminate NST, will send to L&D for BPP  - GBS pos - PCN allergic and Clinda resistant, plan for Vanc, but patient may consider Ancef  - IOL scheduled   - return precautions reviewed    RTO in 1 week(s).

## 2024-05-14 NOTE — DISCHARGE INSTRUCTIONS
Discharge Instructions    Diet: REgular  Activity: Normal activity         General Instructions    Call your OB doctor if: Fluid leaking from your vagina;Decrease in fetal movement;Vaginal or rectal pressure;Uterine contractions 10 minutes or closer for 1 to 2 hours;Vaginal bleeding;Uterine contractions increasing in intensity and frequency;Temperature greater than 100F  Early labor comfort measures: Drink fluids and eat small light meals;Take a walk;Relax, sleep, take a warm bath or shower for 30 minutes or less

## 2024-05-14 NOTE — PROGRESS NOTES
Pt is a 38 year old female admitted to TRG2/TRG2-A.     Chief Complaint   Patient presents with    Non-stress Test      Pt is  37w4d intra-uterine pregnancy.  History obtained, consents signed. Oriented to room, staff, and plan of care.    Pt was sent from office, per Dr Dillon, unable to determine baseline.   EFM tested and applied.

## 2024-05-24 ENCOUNTER — ROUTINE PRENATAL (OUTPATIENT)
Dept: OBGYN CLINIC | Facility: CLINIC | Age: 39
End: 2024-05-24

## 2024-05-24 ENCOUNTER — ANESTHESIA (OUTPATIENT)
Dept: OBGYN UNIT | Facility: HOSPITAL | Age: 39
End: 2024-05-24

## 2024-05-24 ENCOUNTER — ANESTHESIA EVENT (OUTPATIENT)
Dept: OBGYN UNIT | Facility: HOSPITAL | Age: 39
End: 2024-05-24

## 2024-05-24 ENCOUNTER — APPOINTMENT (OUTPATIENT)
Dept: OBGYN CLINIC | Facility: CLINIC | Age: 39
End: 2024-05-24

## 2024-05-24 ENCOUNTER — HOSPITAL ENCOUNTER (INPATIENT)
Facility: HOSPITAL | Age: 39
LOS: 2 days | Discharge: HOME | End: 2024-05-26
Attending: OBSTETRICS & GYNECOLOGY | Admitting: OBSTETRICS & GYNECOLOGY

## 2024-05-24 VITALS
SYSTOLIC BLOOD PRESSURE: 110 MMHG | HEART RATE: 99 BPM | WEIGHT: 191.38 LBS | BODY MASS INDEX: 32 KG/M2 | DIASTOLIC BLOOD PRESSURE: 70 MMHG

## 2024-05-24 DIAGNOSIS — O09.523 MULTIGRAVIDA OF ADVANCED MATERNAL AGE IN THIRD TRIMESTER (HCC): ICD-10-CM

## 2024-05-24 DIAGNOSIS — Z34.80 SUPERVISION OF OTHER NORMAL PREGNANCY, ANTEPARTUM (HCC): Primary | ICD-10-CM

## 2024-05-24 PROBLEM — O34.219 DESIRES VBAC (VAGINAL BIRTH AFTER CESAREAN) TRIAL (HCC): Status: ACTIVE | Noted: 2024-05-24

## 2024-05-24 PROBLEM — Z34.90 PREGNANCY (HCC): Status: ACTIVE | Noted: 2024-05-24

## 2024-05-24 PROBLEM — O34.219 HISTORY OF CESAREAN DELIVERY AFFECTING PREGNANCY (HCC): Status: ACTIVE | Noted: 2024-05-24

## 2024-05-24 PROBLEM — O34.219 VBAC (VAGINAL BIRTH AFTER CESAREAN) (HCC): Status: ACTIVE | Noted: 2024-05-24

## 2024-05-24 LAB
ANTIBODY SCREEN: NEGATIVE
BASOPHILS # BLD AUTO: 0.05 X10(3) UL (ref 0–0.2)
BASOPHILS NFR BLD AUTO: 0.5 %
DEPRECATED HBV CORE AB SER IA-ACNC: 21 NG/ML
EOSINOPHIL # BLD AUTO: 0.06 X10(3) UL (ref 0–0.7)
EOSINOPHIL NFR BLD AUTO: 0.7 %
ERYTHROCYTE [DISTWIDTH] IN BLOOD BY AUTOMATED COUNT: 18.8 %
EST. AVERAGE GLUCOSE BLD GHB EST-MCNC: 123 MG/DL (ref 68–126)
HBA1C MFR BLD: 5.9 % (ref ?–5.7)
HCT VFR BLD AUTO: 41.6 %
HGB BLD-MCNC: 13.1 G/DL
IMM GRANULOCYTES # BLD AUTO: 0.06 X10(3) UL (ref 0–1)
IMM GRANULOCYTES NFR BLD: 0.7 %
IRON SATN MFR SERPL: 20 %
IRON SERPL-MCNC: 113 UG/DL
LYMPHOCYTES # BLD AUTO: 2.03 X10(3) UL (ref 1–4)
LYMPHOCYTES NFR BLD AUTO: 22.1 %
MCH RBC QN AUTO: 21.2 PG (ref 26–34)
MCHC RBC AUTO-ENTMCNC: 31.5 G/DL (ref 31–37)
MCV RBC AUTO: 67.2 FL
MONOCYTES # BLD AUTO: 0.5 X10(3) UL (ref 0.1–1)
MONOCYTES NFR BLD AUTO: 5.4 %
NEODAT: NEGATIVE
NEUTROPHILS # BLD AUTO: 6.49 X10 (3) UL (ref 1.5–7.7)
NEUTROPHILS # BLD AUTO: 6.49 X10(3) UL (ref 1.5–7.7)
NEUTROPHILS NFR BLD AUTO: 70.6 %
PLATELET # BLD AUTO: 153 10(3)UL (ref 150–450)
PLATELETS.RETICULATED NFR BLD AUTO: 10.8 % (ref 0–7)
RBC # BLD AUTO: 6.19 X10(6)UL
RH BLOOD TYPE: POSITIVE
RH BLOOD TYPE: POSITIVE
T PALLIDUM AB SER QL IA: NONREACTIVE
TIBC SERPL-MCNC: 554 UG/DL (ref 240–450)
TRANSFERRIN SERPL-MCNC: 372 MG/DL (ref 200–360)
WBC # BLD AUTO: 9.2 X10(3) UL (ref 4–11)

## 2024-05-24 PROCEDURE — 0KQM0ZZ REPAIR PERINEUM MUSCLE, OPEN APPROACH: ICD-10-PCS | Performed by: OBSTETRICS & GYNECOLOGY

## 2024-05-24 RX ORDER — CITRIC ACID/SODIUM CITRATE 334-500MG
30 SOLUTION, ORAL ORAL AS NEEDED
Status: DISCONTINUED | OUTPATIENT
Start: 2024-05-24 | End: 2024-05-24

## 2024-05-24 RX ORDER — LIDOCAINE HYDROCHLORIDE AND EPINEPHRINE 15; 5 MG/ML; UG/ML
INJECTION, SOLUTION EPIDURAL AS NEEDED
Status: DISCONTINUED | OUTPATIENT
Start: 2024-05-24 | End: 2024-05-24 | Stop reason: SURG

## 2024-05-24 RX ORDER — ACETAMINOPHEN 500 MG
1000 TABLET ORAL EVERY 6 HOURS PRN
Status: DISCONTINUED | OUTPATIENT
Start: 2024-05-24 | End: 2024-05-26

## 2024-05-24 RX ORDER — SODIUM CHLORIDE, SODIUM LACTATE, POTASSIUM CHLORIDE, CALCIUM CHLORIDE 600; 310; 30; 20 MG/100ML; MG/100ML; MG/100ML; MG/100ML
INJECTION, SOLUTION INTRAVENOUS CONTINUOUS
Status: DISCONTINUED | OUTPATIENT
Start: 2024-05-24 | End: 2024-05-24

## 2024-05-24 RX ORDER — SIMETHICONE 80 MG
80 TABLET,CHEWABLE ORAL 3 TIMES DAILY PRN
Status: DISCONTINUED | OUTPATIENT
Start: 2024-05-24 | End: 2024-05-26

## 2024-05-24 RX ORDER — TERBUTALINE SULFATE 1 MG/ML
0.25 INJECTION, SOLUTION SUBCUTANEOUS AS NEEDED
Status: DISCONTINUED | OUTPATIENT
Start: 2024-05-24 | End: 2024-05-24

## 2024-05-24 RX ORDER — ACETAMINOPHEN 500 MG
500 TABLET ORAL EVERY 6 HOURS PRN
Status: DISCONTINUED | OUTPATIENT
Start: 2024-05-24 | End: 2024-05-26

## 2024-05-24 RX ORDER — IBUPROFEN 600 MG/1
600 TABLET ORAL EVERY 6 HOURS
Status: DISCONTINUED | OUTPATIENT
Start: 2024-05-24 | End: 2024-05-26

## 2024-05-24 RX ORDER — IBUPROFEN 600 MG/1
600 TABLET ORAL ONCE AS NEEDED
Status: DISCONTINUED | OUTPATIENT
Start: 2024-05-24 | End: 2024-05-24

## 2024-05-24 RX ORDER — DOCUSATE SODIUM 100 MG/1
100 CAPSULE, LIQUID FILLED ORAL
Status: DISCONTINUED | OUTPATIENT
Start: 2024-05-24 | End: 2024-05-26

## 2024-05-24 RX ORDER — ACETAMINOPHEN 500 MG
1000 TABLET ORAL EVERY 6 HOURS PRN
Status: DISCONTINUED | OUTPATIENT
Start: 2024-05-24 | End: 2024-05-24

## 2024-05-24 RX ORDER — BISACODYL 10 MG
10 SUPPOSITORY, RECTAL RECTAL ONCE AS NEEDED
Status: DISCONTINUED | OUTPATIENT
Start: 2024-05-24 | End: 2024-05-26

## 2024-05-24 RX ORDER — VANCOMYCIN 1.75 GRAM/500 ML IN 0.9 % SODIUM CHLORIDE INTRAVENOUS
20 EVERY 8 HOURS
Status: DISCONTINUED | OUTPATIENT
Start: 2024-05-24 | End: 2024-05-24

## 2024-05-24 RX ORDER — DEXTROSE, SODIUM CHLORIDE, SODIUM LACTATE, POTASSIUM CHLORIDE, AND CALCIUM CHLORIDE 5; .6; .31; .03; .02 G/100ML; G/100ML; G/100ML; G/100ML; G/100ML
INJECTION, SOLUTION INTRAVENOUS AS NEEDED
Status: DISCONTINUED | OUTPATIENT
Start: 2024-05-24 | End: 2024-05-24

## 2024-05-24 RX ORDER — ACETAMINOPHEN 500 MG
500 TABLET ORAL EVERY 6 HOURS PRN
Status: DISCONTINUED | OUTPATIENT
Start: 2024-05-24 | End: 2024-05-24

## 2024-05-24 RX ORDER — ONDANSETRON 2 MG/ML
4 INJECTION INTRAMUSCULAR; INTRAVENOUS EVERY 6 HOURS PRN
Status: DISCONTINUED | OUTPATIENT
Start: 2024-05-24 | End: 2024-05-24

## 2024-05-24 RX ORDER — NALBUPHINE HYDROCHLORIDE 10 MG/ML
2.5 INJECTION, SOLUTION INTRAMUSCULAR; INTRAVENOUS; SUBCUTANEOUS
Status: DISCONTINUED | OUTPATIENT
Start: 2024-05-24 | End: 2024-05-24

## 2024-05-24 RX ORDER — BUPIVACAINE HCL/0.9 % NACL/PF 0.25 %
5 PLASTIC BAG, INJECTION (ML) EPIDURAL AS NEEDED
Status: DISCONTINUED | OUTPATIENT
Start: 2024-05-24 | End: 2024-05-24

## 2024-05-24 RX ADMIN — LIDOCAINE HYDROCHLORIDE AND EPINEPHRINE 5 ML: 15; 5 INJECTION, SOLUTION EPIDURAL at 16:20:00

## 2024-05-24 RX ADMIN — LIDOCAINE HYDROCHLORIDE AND EPINEPHRINE 3 ML: 15; 5 INJECTION, SOLUTION EPIDURAL at 17:37:00

## 2024-05-24 RX ADMIN — LIDOCAINE HYDROCHLORIDE AND EPINEPHRINE 2 ML: 15; 5 INJECTION, SOLUTION EPIDURAL at 17:40:00

## 2024-05-24 NOTE — L&D DELIVERY NOTE
James Cason [[id number]]      Labor Events     labor?: No   steroids?: None  Antibiotics received during labor?: Yes  Antibiotics (enter # doses in comment): cefazolin (Comment: x 1 dose)  Rupture date/time: 2024 1705     Rupture type: SROM  Fluid color: Clear  Labor type: Spontaneous Onset of Labor       Labor Length    1st stage: 13h 10m  2nd stage: 0h 05m  3rd stage: 0h 06m       Labor Event Times    Labor onset date/time: 2024 0500  Dilation complete date/time: 2024 181  Start pushing date/time: 2024       [place] Presentation    Presentation: Vertex  Position: Occiput Anterior       Operative Delivery    Operative Vaginal Delivery: N/A                Shoulder Dystocia    Shoulder Dystocia: N/A       Anesthesia    Method: Epidural              [place] Delivery      Delivery date/time:  24 18:15:03   Delivery type: Vaginal birth after caesarian    Details:     Delivery location: delivery room       Delivery Providers    Delivering Clinician: Nely Maher MD   Delivery personnel:  Provider Role   Jazzy Negron RN Baby Nurse   Ivory Hearn RN Delivery Nurse             Cord    Vessels: 3 Vessels  Complications: None  Timed cord clamping: Yes  Time in sec: 30  Cord blood disposition: to lab  Gases sent?: No       Resuscitation    Method: None       [place] Measurements      Weight: 3720 g 8 lb 3.2 oz Length: 54.6 cm     Head circum.: 34 cm Chest circum.: 34 cm      Abdominal circum.: 31 cm           Placenta    Date/time: 2024 182  Removal: Spontaneous  Appearance: Intact  Disposition: held for future pathology       Apgars    Living status: Living   Apgar Scoring Key:    0 1 2    Skin color Blue or pale Acrocyanotic Completely pink    Heart rate Absent <100 bpm >100 bpm    Reflex irritability No response Grimace Cry or active withdrawal    Muscle tone Limp Some flexion Active motion    Respiratory effort Absent Weak cry; hypoventilation  Good, crying              1 Minute:  5 Minute:  10 Minute:  15 Minute:  20 Minute:      Skin color: 1  1       Heart rate: 2  2       Reflex irritablity: 2  2       Muscle tone: 2  2       Respiratory effort: 2  2       Total: 9  9          Apgars assigned by: SHRAVAN BALDWIN   disposition: with mother       Skin to Skin    Skin to skin initiated date/time: 2024 184  Skin to skin with: Mother       Vaginal Count    Initial count RN: Ivory Hearn RN  Initial count Tech: Mytych, Sanjuana R   Sponges   Sharps    Initial counts 11   0    Final counts 11   2    Final count RN: Ivory Hearn RN  Final count MD: Nely Maher MD       Lacerations    Episiotomy: None  Perineal lacerations: 2nd Repaired?: Yes     Vaginal laceration?: No      Cervical laceration?: No    Clitoral laceration?: No    Quantitative blood loss (mL): 574            39 year old  at 39w0d presented sent over from OB office today due to contractions and advanced cervical dilation, /2 in clinic with intention IOL/augmentation due to GBS positive, penicillin allergy, clindamycin resistant. Patient comfortable with cefazolin.      AMA, alpha thalasemia trait. History of  section with 2 successful . She desires TOLAC. Iron deficiency anemia (ferritin 11.9 on 24). Failed 1 hour glucose test. 3 hr glucose test with 1 elevated value.     IV antibiotics were started. Due to busyness of labor unit, patient ruptured her own membranes spontaneously and progressed on her own in labor without intervention. Patient had a difficult and prolonged epidural placement during which she progressed from 6 cm to complete. She reports her other epidurals were difficult as well.     She progressed to complete. Pushed with good efforts. Delivered a viable male infant over an intact perineum via vertex presentation OA position. Delayed cord clamping was performed. Cord blood obtained. IV oxytocin administered. Placenta expressed  apparently intact. Uterine tone was good. Perineum inspected. Laceration(s): 2nd degree perineal repaired 3-0 vicryl under local anesthetic. See Delivery report for QBL or EBL.      Nely Maher MD

## 2024-05-24 NOTE — PROGRESS NOTES
39w0d seen for routine OB visit.   Ctx through the night, but have eased up this morning. Denies lof, vb. Reports good FM.    Patient Active Problem List   Diagnosis    Supervision of other normal pregnancy, antepartum ([facility])    AMA (advanced maternal age) multigravida 35+ ([facility])    Prediabetes    Hypercholesteremia    Hx successful  (vaginal birth after ), currently pregnant ([facility])    Medication exposure during first trimester of pregnancy ([facility])    Abnormal maternal glucose tolerance, antepartum ([facility])    Antepartum anemia ([facility])    Tachycardia    GBS (group B Streptococcus carrier), +RV culture, currently pregnant ([facility])        TW lb 6 oz (12 kg)   Depression Scale Total: 0 (2024 10:42 AM)      Gen: AAOx3, NAD  Resp: breathing comfortably  Abdomen: gravid, soft, nontender  Ext: nontender, no edema  SVE: /-2    NST - baseline 155, moderate variability, +15x15 accels, no decels  [place] - quiet    Plan:  - reactive NST, continue weekly  - GBS pos - PCN allergic, clinda resistant. After discussion is comfortable treating with Ancef.  - 3T labs neg  - Recommend moving IOL/augmentation to today to ensure her GBS is treated. Will send to L&D today, discussed with L&D charge RN.  - return precautions reviewed    RTO in 1 week(s).

## 2024-05-24 NOTE — H&P
[facility]   part of [facility]    History & Physical    Kal Cason Patient Status:  Inpatient    1985 MRN [id number]   Location [facility] LABOR & DELIVERY Attending Felipa Knox MD   Hosp Day # 0 PCP Jaimie Royal MD     Date of Admission:  2024 11:06 AM     HPI:   Kal Cason is a 39 year old  at 39w0d sent over from OB office today due to contractions and advanced cervical dilation, /2 in clinic with intention IOL/augmentation due to GBS positive, penicillin allergy, clindamycin resistant. Patient comfortable with cefazolin.     AMA, alpha thalasemia trait. History of  section with 2 successful . She desires TOLAC. Iron deficiency anemia (ferritin 11.9 on 24). Failed 1 hour glucose test. 3 hr glucose test with 1 elevated value.     +FM. Occasional contractions. No leaking fluid or vaginal bleeding.     History   Obstetric History:   OB History    Para Term  AB Living   4 3 3 0 0 3   SAB IAB Ectopic Multiple Live Births   0 0 0   3      # Outcome Date GA Lbr Manuel/2nd Weight Sex Type Anes PTL Lv   4 Current            3 Term 22 38w0d 21:48 / 00:43 7 lb 5.5 oz (3.33 kg) F VAGINAL MAGDI EPI N DOMINIQUE      Complications: Group B beta strep +, Variable decelerations   2 Term 08/15/20 38w5d 23:50 / 00:30 8 lb 4.6 oz (3.76 kg) F VAGINAL MAGDI EPI N DOMINIQUE      Birth Comments: Mother: G 2 P   GBS (pos) resistent to clinda which mom received 3 doses of, no maternal temp, baby doing fine   HbsAg (neg)  Rubella Immune RPR/Treponemal Ab NR      HIV neg  COVID not detected               Blood Type O pos   Infant: S/A/ or LGA? AGA  Blood type   Bev ()    Hearing Screen pass bilat CCHD pass    Time of birth 12:05am  SROM @11:45pm on 2020 (over 24 hours)  D/C bilirubin 13.6 at 53 hrs of life,   HR, serum bili at 56 hrs,  LIR  Other labs: cbc and  Blood culture, no growth 1 day  Prenatal or post- problems?     [place] Metabolic  Screen dated       Complications: Group B beta strep +   1 Term 17 39w2d  7 lb 12.2 oz (3.52 kg) F CS-LTranv Spinal N DOMINIQUE      Birth Comments: Mother: G1 P 1001  GBS (pos)  HbsAg (neg)  Rubella immune RPR NR Blood Type O+  Infant:  AGA  Hearing Screen pass  CCHD pass    Time of birth 4:10 am   D/C bilirubin 8.80 at 62 hrs of life  Other labs:   Prenatal or post- problems?  Breech presentation   Mom GBS POS    [place] Metabolic Screen dated      Past Medical History:   Past Medical History:   Diagnosis Date    Alpha thalassemia trait 2016    Microcytosis. 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 This result predicts the deletion of the mu, alpha2, alpha1 and theta1 globin genes on a single chromosome and is consistent with alpha (0) thalassemia (trait). Heterozygosity for this deletion is often associated with mild anemia and microcytosis.    Back pain 07/15/2016    Iron deficiency 2024    Microcytic anemia       Past Social History:   Past Surgical History:   Procedure Laterality Date          Laparoscopy,pelvic,biopsy Left     remove cyst on ovary     Family History:   Family History   Problem Relation Age of Onset    Diabetes Father     Lipids Father     Hypertension Mother     Thyroid Disorder Mother     Breast Cancer Mother     High Cholesterol Mother     Other (Other) Mother         back cyst removed    Thyroid Disorder Sister     Other (Other) Sister         brain cyst removed after pregnancy     Social History:   Social History     Tobacco Use    Smoking status: Never    Smokeless tobacco: Never   Substance Use Topics    Alcohol use: No        Allergies/Medications:   Allergies:   Allergies   Allergen Reactions    Amoxicillin HIVES    Penicillins HIVES     Medications:  Medications Prior to Admission   Medication Sig Dispense Refill Last Dose    ferrous sulfate 325 (65 FE) MG Oral Tab EC Take 1 tablet (325  mg total) by mouth daily with breakfast.   2024    Prenatal Vit-Fe Fumarate-FA (PRENATAL VITAMINS PLUS) 27-1 MG Oral Tab Take 1 tablet by mouth daily.   2024    Iron, Ferrous Sulfate, 325 (65 Fe) MG Oral Tab           Review of Systems:   As documented in HPI    Physical Exam:   Temp:  [98.4 °F (36.9 °C)] 98.4 °F (36.9 °C)  Pulse:  [] 100  Resp:  [16] 16  BP: (108-110)/(63-70) 108/63    Vitals:    24 1140 24 1152   BP: 108/63    Pulse: 100    Resp: 16    Temp: 98.4 °F (36.9 °C)    TempSrc: Oral    Weight:  191 lb (86.6 kg)   Height:  65\"       Estimated body mass index is 31.78 kg/m² as calculated from the following:    Height as of this encounter: 65\".    Weight as of this encounter: 191 lb (86.6 kg).     FHT: reactive   [place] occasional contraction  SVE 5 cm      Results:     Component      Latest Ref Rng 2024  11:25 AM   WBC      4.0 - 11.0 x10(3) uL 9.2    RBC      3.80 - 5.30 x10(6)uL 6.19 (H)    Hemoglobin      12.0 - 16.0 g/dL 13.1    Hematocrit      35.0 - 48.0 % 41.6    Platelet Count      150.0 - 450.0 10(3)uL 153.0    Immature Platelet Fraction      0.0 - 7.0 % 10.8 (H)    MCV      80.0 - 100.0 fL 67.2 (L)    MCH      26.0 - 34.0 pg 21.2 (L)    MCHC      31.0 - 37.0 g/dL 31.5    RDW      % 18.8    Prelim Neutrophil Abs      1.50 - 7.70 x10 (3) uL 6.49    Neutrophils Absolute      1.50 - 7.70 x10(3) uL 6.49    Lymphocytes Absolute      1.00 - 4.00 x10(3) uL 2.03    Monocytes Absolute      0.10 - 1.00 x10(3) uL 0.50    Eosinophils Absolute      0.00 - 0.70 x10(3) uL 0.06    Basophils Absolute      0.00 - 0.20 x10(3) uL 0.05    Immature Granulocyte Absolute      0.00 - 1.00 x10(3) uL 0.06    Neutrophils %      % 70.6    Lymphocytes %      % 22.1    Monocytes %      % 5.4    Eosinophils %      % 0.7    Basophils %      % 0.5    Immature Granulocyte %      % 0.7       Legend:  (H) High  (L) Low    Assessment/Plan:    Kal Cason 39 year old  at 39w0d - History of   section with 2 successful . She desires TOLAC. Sent from office for IOL/augmentation due to advanced cervical dilation, /-2 and GBS positive, penicillin allergy, clindamycin resistant.    +FWB    Desires TOLAC  -has been counseled regarding risk of uterine rupture. 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 This result predicts the deletion of the mu, alpha2, alpha1 and theta1 globin genes on a single chromosome and is consistent with alpha (0) thalassemia (trait). Heterozygosity for this deletion is often associated with mild anemia and microcytosis.   -will check ferritin, iron, TIBC    Disposition - inpatient     Nely Maher MD    Note to patient and family:  The  [place] Cures Act makes medical notes available to patients in the interest of transparency.  However, please be advised that this is a medical document.  It is intended as a peer to peer communication.  It is written in medical language and may contain abbreviations or verbiage that are technical and unfamiliar.  It may appear blunt or direct.  Medical documents are intended to carry relevant information, facts as evident, and the clinical opinion of the practitioner.

## 2024-05-24 NOTE — ANESTHESIA PREPROCEDURE EVALUATION
PRE-OP EVALUATION    Patient Name: Kal Cason    Admit Diagnosis: PREGNANACY  Pregnancy (HCC)    Pre-op Diagnosis: * No pre-op diagnosis entered *        Anesthesia Procedure: LABOR ANALGESIA    * No surgeons found in log *    Pre-op vitals reviewed.  Temp: 98.4 °F (36.9 °C)  Pulse: 100  Resp: 16  BP: 108/63     Body mass index is 31.78 kg/m².    Current medications reviewed.  Hospital Medications:   lactated ringers infusion   Intravenous Continuous    dextrose in lactated ringers 5% infusion   Intravenous PRN    lactated ringers IV bolus 500 mL  500 mL Intravenous PRN    acetaminophen (Tylenol Extra Strength) tab 500 mg  500 mg Oral Q6H PRN    acetaminophen (Tylenol Extra Strength) tab 1,000 mg  1,000 mg Oral Q6H PRN    ibuprofen (Motrin) tab 600 mg  600 mg Oral Once PRN    ondansetron (Zofran) 4 MG/2ML injection 4 mg  4 mg Intravenous Q6H PRN    oxyTOCIN in sodium chloride 0.9% (Pitocin) 30 Units/500mL infusion premix  62.5-900 katie-units/min Intravenous Continuous    terbutaline (Brethine) 1 MG/ML injection 0.25 mg  0.25 mg Subcutaneous PRN    sodium citrate-citric acid (Bicitra) 500-334 MG/5ML oral solution 30 mL  30 mL Oral PRN    oxyTOCIN in sodium chloride 0.9% (Pitocin) 30 Units/500mL infusion premix  0.5-20 katie-units/min Intravenous Continuous    ceFAZolin (Ancef) 1 g in dextrose 5% 100mL IVPB-ADD  1 g Intravenous Q8H    fentaNYL-bupivacaine in sodium chloride 0.9% 2 mcg/mL-0.125% EPIDURAL infusion premix        fentaNYL (Sublimaze) 50 mcg/mL injection        lactated ringers IV bolus 1,000 mL  1,000 mL Intravenous Once    fentaNYL-bupivacaine 2 mcg/mL-0.125% in sodium chloride 0.9% 100 mL EPIDURAL infusion premix  12 mL/hr Epidural Continuous    fentaNYL (Sublimaze) 50 mcg/mL injection 100 mcg  100 mcg Epidural Once    EPHEDrine (PF) 25 MG/5 ML injection 5 mg  5 mg Intravenous PRN    nalbuphine (Nubain) 10 mg/mL injection 2.5 mg  2.5 mg Intravenous Q15 Min PRN       Outpatient Medications:      Medications Prior to Admission   Medication Sig Dispense Refill Last Dose    ferrous sulfate 325 (65 FE) MG Oral Tab EC Take 1 tablet (325 mg total) by mouth daily with breakfast.   2024    Prenatal Vit-Fe Fumarate-FA (PRENATAL VITAMINS PLUS) 27-1 MG Oral Tab Take 1 tablet by mouth daily.   2024    Iron, Ferrous Sulfate, 325 (65 Fe) MG Oral Tab           Allergies: Amoxicillin and Penicillins      Anesthesia Evaluation    Patient summary reviewed.    Anesthetic Complications  (-) history of anesthetic complications         GI/Hepatic/Renal    Negative GI/hepatic/renal ROS.                             Cardiovascular    Negative cardiovascular ROS.                                                   Endo/Other    Negative endo/other ROS.                              Pulmonary    Negative pulmonary ROS.                       Neuro/Psych    Negative neuro/psych ROS.                                  Past Surgical History:   Procedure Laterality Date      2017    Laparoscopy,pelvic,biopsy Left 2012    remove cyst on ovary     Social History     Socioeconomic History    Marital status:    Tobacco Use    Smoking status: Never    Smokeless tobacco: Never   Vaping Use    Vaping status: Never Used   Substance and Sexual Activity    Alcohol use: No    Drug use: No    Sexual activity: Not Currently     Partners: Male   Other Topics Concern    Caffeine Concern No     Comment: 2 cups a week    Exercise Yes    Seat Belt Yes    Special Diet No    Reaction to local anesthetic No    Pt has a pacemaker No    Pt has a defibrillator No     History   Drug Use No     Available pre-op labs reviewed.  Lab Results   Component Value Date    WBC 9.2 2024    RBC 6.19 (H) 2024    HGB 13.1 2024    HCT 41.6 2024    MCV 67.2 (L) 2024    MCH 21.2 (L) 2024    MCHC 31.5 2024    RDW 18.8 2024    .0 2024               Airway      Mallampati: II  Mouth opening: >3  FB  TM distance: > 6 cm  Neck ROM: full Cardiovascular      Rhythm: regular  Rate: normal     Dental  Comment: No obvious evidence of dental disease           Pulmonary      Breath sounds clear to auscultation bilaterally.               Other findings              ASA: 2   Plan: epidural           Comment: All anesthetic related questions were addressed.  Pt expressed understanding of and agreement with the anesthetic plan.      Plan/risks discussed with: patient and spouse                Present on Admission:   Pregnancy ([facility])   GBS (group B Streptococcus carrier), +RV culture, currently pregnant ([facility])   AMA (advanced maternal age) multigravida 35+ ([facility])   History of  delivery affecting pregnancy ([facility])

## 2024-05-24 NOTE — ANESTHESIA PROCEDURE NOTES
Labor Analgesia    Date/Time: 5/24/2024 4:04 PM    Performed by: Mason Almonte MD  Authorized by: Mason Almonte MD      General Information and Staff    Start Time:  5/24/2024 4:04 PM  End Time:  5/24/2024 4:20 PM  Anesthesiologist:  Mason Almonte MD  Performed by:  Anesthesiologist  Patient Location:  OB  Site Identification: surface landmarks    Reason for Block: labor epidural    Preanesthetic Checklist: patient identified, IV checked, risks and benefits discussed, monitors and equipment checked, pre-op evaluation, timeout performed, IV bolus, anesthesia consent and sterile technique used      Procedure Details    Patient Position:  Sitting  Prep: ChloraPrep    Monitoring:  Heart rate and continuous pulse ox  Approach:  Midline    Epidural Needle    Injection Technique:  ANCA saline  Needle Type:  Tuohy  Needle Gauge:  17 G  Needle Length:  3.375 in  Location:  L3-4    Spinal Needle    Needle Type:  Sprotte tip  Needle Gauge:  24 G  Needle Length:  4.75 in    Catheter    Catheter Type:  End hole  Catheter Size:  19 G  Catheter at Skin Depth:  12  Test Dose:  Negative    Assessment      Additional Comments          1st pass L3-4 + os.  1% lido L2-3, 2nd pass + ANCA to saline.  NTN 24G Sprotte, however, no CSF obtained.  Catheter placed easily.  Negative aspiration.  However, administration of test dose yielded increased HR.  No epidural local anesthetic solution was administered.  Catheter removed, tip intact.  We discussed that positive test dose indicates that the catheter was intravascular despite negative aspiration.           We discussed that pt could take a rest for now and decide if she would like to pursue epidural placement again.  The pt indicated she would consider whether she would like us to make another attempt.    Mason Almonte MD  [facility]

## 2024-05-24 NOTE — PLAN OF CARE
Problem: BIRTH - VAGINAL/ SECTION  Goal: Fetal and maternal status remain reassuring during the birth process  Description: INTERVENTIONS:  - Monitor vital signs  - Monitor fetal heart rate  - Monitor uterine activity  - Monitor labor progression (vaginal delivery)  - DVT prophylaxis (C/S delivery)  - Surgical antibiotic prophylaxis (C/S delivery)  Outcome: Progressing     Problem: PAIN - ADULT  Goal: Verbalizes/displays adequate comfort level or patient's stated pain goal  Description: INTERVENTIONS:  - Encourage pt to monitor pain and request assistance  - Assess pain using appropriate pain scale  - Administer analgesics based on type and severity of pain and evaluate response  - Implement non-pharmacological measures as appropriate and evaluate response  - Consider cultural and social influences on pain and pain management  - Manage/alleviate anxiety  - Utilize distraction and/or relaxation techniques  - Monitor for opioid side effects  - Notify MD/LIP if interventions unsuccessful or patient reports new pain  - Anticipate increased pain with activity and pre-medicate as appropriate  Outcome: Progressing     Problem: ANXIETY  Goal: Will report anxiety at manageable levels  Description: INTERVENTIONS:  - Administer medication as ordered  - Teach and rehearse alternative coping skills  - Provide emotional support with 1:1 interaction with staff  Outcome: Progressing     Problem: Patient/Family Goals  Goal: Patient/Family Long Term Goal  Description: Patient's Long Term Goal:     Interventions:  -  - See additional Care Plan goals for specific interventions  Outcome: Progressing  Note: Adequate pain control with delivery of infant  Interventions:  Pain assessment scores as ordered  Patient scores pain a \"3\" or less  Multidisciplinary care   Nonpharmacologic comfort measures  Goal: Patient/Family Short Term Goal  Description: Patient's Short Term Goal:   Interventions:   -  - See additional Care Plan goals  for specific interventions  Outcome: Progressing  Note: Uncomplicated vaginal delivery    Interventions:  VS per protocol  I&O  Ice chips and sips as tolerated  EFM per protocol  Maintain IV as ordered  Antibiotics as needed per protocol  Informed consent

## 2024-05-24 NOTE — PROGRESS NOTES
Pt is a 39 year old female admitted to -A.     Chief Complaint   Patient presents with    Scheduled Induction     Elective induction for advanced dilation       Pt is  39w0d intra-uterine pregnancy.  History obtained, consents signed. Oriented to room, staff, and plan of care.

## 2024-05-25 LAB
BASOPHILS # BLD AUTO: 0.02 X10(3) UL (ref 0–0.2)
BASOPHILS NFR BLD AUTO: 0.2 %
EOSINOPHIL # BLD AUTO: 0.08 X10(3) UL (ref 0–0.7)
EOSINOPHIL NFR BLD AUTO: 0.8 %
ERYTHROCYTE [DISTWIDTH] IN BLOOD BY AUTOMATED COUNT: 18.2 %
HCT VFR BLD AUTO: 36.2 %
HGB BLD-MCNC: 11.3 G/DL
IMM GRANULOCYTES # BLD AUTO: 0.04 X10(3) UL (ref 0–1)
IMM GRANULOCYTES NFR BLD: 0.4 %
LYMPHOCYTES # BLD AUTO: 1.77 X10(3) UL (ref 1–4)
LYMPHOCYTES NFR BLD AUTO: 16.8 %
MCH RBC QN AUTO: 21.3 PG (ref 26–34)
MCHC RBC AUTO-ENTMCNC: 31.2 G/DL (ref 31–37)
MCV RBC AUTO: 68.2 FL
MONOCYTES # BLD AUTO: 0.67 X10(3) UL (ref 0.1–1)
MONOCYTES NFR BLD AUTO: 6.4 %
NEUTROPHILS # BLD AUTO: 7.94 X10 (3) UL (ref 1.5–7.7)
NEUTROPHILS # BLD AUTO: 7.94 X10(3) UL (ref 1.5–7.7)
NEUTROPHILS NFR BLD AUTO: 75.4 %
PLATELET # BLD AUTO: 123 10(3)UL (ref 150–450)
PLATELETS.RETICULATED NFR BLD AUTO: 11.1 % (ref 0–7)
RBC # BLD AUTO: 5.31 X10(6)UL
WBC # BLD AUTO: 10.5 X10(3) UL (ref 4–11)

## 2024-05-25 RX ORDER — IBUPROFEN 600 MG/1
600 TABLET ORAL EVERY 6 HOURS PRN
Qty: 20 TABLET | Refills: 0 | Status: SHIPPED | OUTPATIENT
Start: 2024-05-25

## 2024-05-25 NOTE — ANESTHESIA PROCEDURE NOTES
Labor Analgesia    Date/Time: 5/24/2024 5:10 PM    Performed by: Jason Roque MD  Authorized by: Jason Roque MD      General Information and Staff    Start Time:  5/24/2024 5:10 PM  End Time:  5/24/2024 4:41 PM  Anesthesiologist:  Jason Roque MD  Performed by:  Anesthesiologist  Patient Location:  OB  Site Identification: surface landmarks    Reason for Block: labor epidural    Preanesthetic Checklist: patient identified, IV checked, risks and benefits discussed, monitors and equipment checked, pre-op evaluation, timeout performed, IV bolus, anesthesia consent and sterile technique used      Procedure Details    Patient Position:  Sitting  Prep: ChloraPrep    Monitoring:  Heart rate and continuous pulse ox  Approach:  Midline    Epidural Needle    Injection Technique:   Needle Type:  Tuohy  Needle Gauge:  17 G  Needle Length:  3.375 in  Needle Insertion Depth:  6  Location:  L2-3    Spinal Needle      Catheter    Catheter Type:  End hole  Catheter Size:  19 G  Catheter at Skin Depth:  11  Test Dose:  Negative    Assessment      Additional Comments     Dural puncture epidural performed. Pt positioned sitting. Back prepped/draped. Local 1% lido infiltration. 17g Touhy needle initially inserted at L4-L5 interspace, near previous attempted site, but unable to find ANCA. Second attempt at likely L2-L3 interspace, above previous attempts. ANCA @ 6 cm at the skin, 24g spinal need inserted through Touhy, + CSF return. Epidural catheter easily threaded to 11 cm. Negative aspiration test. 3cc 1.5% lido + epi test dose administered, negative. Catheter secured with tape / tegaderm. Pt positioned supine. Final 2cc test dose + 7 ml epidural solution + 100 mcg fentanyl hand bolus. Labor pain improving, patient comfortable.

## 2024-05-25 NOTE — DISCHARGE SUMMARY
Kal Cason Patient Status:  inpatient    1985 MRN [id number]   Location [facility]   Hosp Day # 1 PCP Jaimie Royal MD     VAGINAL DELIVERY DISCHARGE SUMMARY     Admit date: 2024    Discharge date: 2024     Attending Physician: Felipa Knox MD     Discharge Diagnoses: Term pregnancy, previous  section    Procedures: Vaginal delivery after  section,  repair of 2nd degree perineal laceration    Complications: None    Hospital Course: Patient admitted to hospital at 5 cm of dilation for induction/augmentation of labor, but progressed spontaneously through active phase of labor and had subsequent successful .  Routine postpartum care    Discharged Condition: Stable    Disposition: Home     Current Discharge Medication List        START taking these medications    Details   ibuprofen 600 MG Oral Tab Take 1 tablet (600 mg total) by mouth every 6 (six) hours as needed for Pain.  Qty: 20 tablet, Refills: 0           CONTINUE these medications which have NOT CHANGED    Details   Prenatal Vit-Fe Fumarate-FA (PRENATAL VITAMINS PLUS) 27-1 MG Oral Tab Take 1 tablet by mouth daily.           STOP taking these medications       ferrous sulfate 325 (65 FE) MG Oral Tab EC        Iron, Ferrous Sulfate, 325 (65 Fe) MG Oral Tab              Diet: General    Activity: Light activity, pelvic rest,  no driving for 1 week    Follow-up:   [facility] [place] [facility]  [address] [address] [phone]  Follow up in 6 week(s)  for post partum visit

## 2024-05-25 NOTE — DISCHARGE INSTRUCTIONS
[facility] DISCHARGE INSTRUCTIONS     CONGRATULATIONS on the birth of your baby!!  We are happy that we have been able to be of service to you during your pregnancy.  We trust that your experience in the hospital and with the birth of your child has been a pleasant one.  These instructions are for your reference concerning your care at home between now and your six-week check-up.  Please call the office within the next few days to schedule your appointment.    REST  Since fatigue will probably be your biggest problem, you should try to rest in the morning and in the afternoon for at least 1½-2 hours if possible.  Getting up in the middle of the night to feed your baby interrupts your sleep cycle; two 4 hour periods of sleep do not equal one 8 hour period.  During the day while your baby is sleeping, do whatever you can to isolate yourself from the rest of the world so you can rest (i.e. turn off the phone ringer, put a sign on the front doorbell).    POSTPARTUM BLUES/DEPRESSION  Mood swings, crying spells, feeling overwhelmed and irritability are very common after childbirth.  Most women (50-80%) will experience some of these symptoms, which can last from a few days to 2 weeks.  If your symptoms last more than 2 weeks and/or include any of the following, you may have postpartum depression:  -feelings of sadness      -inability to care for yourself or your baby  -loss of interest in usual activities     -recurring thoughts of death/suicide  -difficulty sleeping or increased need for sleep   -feelings of worthlessness/hopelessness    Postpartum depression occurs in 8-12% of new mothers and is more common in mothers with a past history of depression.  If you have any of these symptoms and/or they persist for more than 2 weeks, please call our office/answering service or [facility] at [phone].    BREAST FEEDING  It is important to be in a relaxed atmosphere when you  are nursing.  You will find that your breasts will engorge and become tender within the next few days.  Even though the suction your baby creates may not meet your expectations, remember that he/she is just learning.  If your breasts remain hard after nursing, you may use a breast pump to release the remaining milk.  This will stimulate your breasts to produce more milk when the need arises.  If your nipples become sore, you should use Lanisol (lanolin) cream and allow your nipples to air-dry after nursing.  It will take approximately 10-12 days for milk supply and demand to balance, so please be patient.  Breastfeeding requires ample rest, fluids (8-10 glasses of water/day) and adequate calories (approximately 2200cal/day).  Please continue your prenatal vitamins the entire time you are breastfeeding.  The lactation consultants at [facility] are available at [phone] to answer any questions or help with any problems.    BREAST CARE  We advise non-nursing mothers to continuously wear a tight-fitting bra and avoid any nipple/breast stimulation.  You should keep your bra as tight as you can tolerate, as this will help dry up your milk.  If your breasts are painful and feel engorged, you may use ice bags and Motrin for relief.  The engorgement and pain/discomfort will usually resolve within 1 week.  You may have some milky discharge from your breasts for several weeks.           EPISIOTOMY CARE  If you have an episiotomy/tear, the stitches used to close the incision will dissolve by themselves.  This process will take at least six weeks during which you should be careful with the area.  If peripads tend to chafe and irritate your skin, we recommend that you place a Tucks, a gauze filled with soothing medication, between your stitches and the peripad.  Tucks can by purchased at your local pharmacy.  Sitz baths may also aid in pain relief and healing; soak your bottom in a tub filled with room temperature water for 20  minutes once or twice per day.  We prefer that you take showers rather than baths, and avoid swimming, for 4-6 weeks after delivery.    SEXUAL INTERCOURSE  Please avoid tampons, douching and intercourse for at least 4-6 weeks, or until you have stopped bleeding, whichever is longer.  When you do resume intercourse, realize that you can start ovulating/become pregnant as early as 2-3 weeks after delivery, even if you are breastfeeding.  Please ask one of your doctors or call the office if you have any questions or desire contraception for use before your six-week check-up.  You may notice more vaginal dryness than usual, especially if you are breastfeeding.  An over-the-counter lubricant such as Replens or Astroglide may provide some relief.  Pain/discomfort at the episiotomy site is not unusual and may last anywhere from 2 weeks to several months.  Massaging the area may help to soften the scar tissue and hasten the healing process.    MENSTRUATION  You may have a vaginal discharge/light bleeding for anywhere from 2-6 weeks after delivery.  The flow may taper off and then suddenly become heavier a few weeks later.  Your first real period may come any time from 4-12 weeks after delivery, but may not come at all if you are nursing.    EXERCISE  We recommend that you avoid strenuous exercise or housework for at least 3-6 weeks after delivery. Realize that your exercise tolerance will be reduced, and therefore you should start slowly and increase gradually.  Walking is acceptable, unless it causes too much discomfort or fatigue.  You should climb stairs the least amount possible for the first week or two after delivery.  When you do climb them, take them slowly.  Avoid making numerous trips when you can organize and make just one.  You should also avoid driving a car, if possible, for the first 1-2 weeks.  Kegels exercises are important to maintain good pelvic muscle tone and help eliminate any urine leakage.  The  exercises involve tightening the muscles around the vagina; try stopping urination in midstream or squeezing around your fingers to learn the correct muscle groups.  Do these exercises several times a day in repetitions of 10; try doing them every time you get to a stoplight or a commercial comes on the TV.    VITAMINS  We strongly encourage you to continue taking your prenatal vitamin until your six-week check-up or until you stop nursing, whichever is longer.  The iron in the vitamin will help to resolve any anemia from the blood loss of the delivery.  Adequate calcium intake is important for all women, but especially while nursing.  Your total calcium intake should be 1200mg/day.  Since the average women gets approximately 500mg in her diet, most women will require 500-700mg of supplemental calcium/day.  You can purchase a supplement such as Tums Ex, Citracal, Oscal or Viactin at your local pharmacy.    WE WISH YOU MUCH NOLAN WITH YOUR NEW BABY AND MAY YOUR NIGHTS BE RESTFUL!!

## 2024-05-25 NOTE — PROGRESS NOTES
Labor Analgesia Follow Up Note    Patient underwent epidural anesthesia for labor analgesia,    Placenta Date/Time: 5/24/2024  6:21 PM     Delivery Date/Time:: 5/24/2024   6:15 PM     /64 (BP Location: Right arm)   Pulse 70   Temp 98.7 °F (37.1 °C) (Oral)   Resp 18   Ht 1.651 m (5' 5\")   Wt 86.6 kg (191 lb)   LMP 08/25/2023   SpO2 95%   Breastfeeding Yes   BMI 31.78 kg/m²     Assessment:  Patient seen and no apparent anesthesia related complications.    Thank you for asking us to participate in the care of your patient.

## 2024-05-25 NOTE — PLAN OF CARE
Problem: SAFETY ADULT - FALL  Goal: Free from fall injury  Description: INTERVENTIONS:  - Assess pt frequently for physical needs  - Identify cognitive and physical deficits and behaviors that affect risk of falls.  - [place] fall precautions as indicated by assessment.  - Educate pt/family on patient safety including physical limitations  - Instruct pt to call for assistance with activity based on assessment  - Modify environment to reduce risk of injury  - Provide assistive devices as appropriate  - Consider OT/PT consult to assist with strengthening/mobility  - Encourage toileting schedule  Outcome: Completed     Problem: POSTPARTUM  Goal: Long Term Goal:Experiences normal postpartum course  Description: INTERVENTIONS:  - Assess and monitor vital signs and lab values.  - Assess fundus and lochia.  - Provide ice/sitz baths for perineum discomfort.  - Monitor healing of incision/episiotomy/laceration, and assess for signs and symptoms of infection and hematoma.  - Assess bladder function and monitor for bladder distention.  - Provide/instruct/assist with pericare as needed.  - Provide VTE prophylaxis as needed.  - Monitor bowel function.  - Encourage ambulation and provide assistance as needed.  - Assess and monitor emotional status and provide social service/psych resources as needed.  - Utilize standard precautions and use personal protective equipment as indicated. Ensure aseptic care of all intravenous lines and invasive tubes/drains.  - Obtain immunization and exposure to communicable diseases history.  Outcome: Progressing  Goal: Optimize infant feeding at the breast  Description: INTERVENTIONS:  - Initiate breast feeding within first hour after birth.   - Monitor effectiveness of current breast feeding efforts.  - Assess support systems available to mother/family.  - Identify cultural beliefs/practices regarding lactation, letdown techniques, maternal food preferences.  - Assess mother's knowledge and  previous experience with breast feeding.  - Provide information as needed about early infant feeding cues (e.g., rooting, lip smacking, sucking fingers/hand) versus late cue of crying.  - Discuss/demonstrate breast feeding aids (e.g., infant sling, nursing footstool/pillows, and breast pumps).  - Encourage mother/other family members to express feelings/concerns, and actively listen.  - Educate father/SO about benefits of breast feeding and how to manage common lactation challenges.  - Recommend avoidance of specific medications or substances incompatible with breast feeding.  - Assess and monitor for signs of nipple pain/trauma.  - Instruct and provide assistance with proper latch.  - Review techniques for milk expression (breast pumping) and storage of breast milk. Provide pumping equipment/supplies, instructions and assistance, as needed.  - Encourage rooming-in and breast feeding on demand.  - Encourage skin-to-skin contact.  - Provide LC support as needed.  - Assess for and manage engorgement.  - Provide breast feeding education handouts and information on community breast feeding support.   Outcome: Progressing  Goal: Establishment of adequate milk supply with medication/procedure interruptions  Description: INTERVENTIONS:  - Review techniques for milk expression (breast pumping).   - Provide pumping equipment/supplies, instructions, and assistance until it is safe to breastfeed infant.  Outcome: Progressing  Goal: Experiences normal breast weaning course  Description: INTERVENTIONS:  - Assess for and manage engorgement.  - Instruct on breast care.  - Provide comfort measures.  Outcome: Progressing  Goal: Appropriate maternal -  bonding  Description: INTERVENTIONS:  - Assess caregiver- interactions.  - Assess caregiver's emotional status and coping mechanisms.  - Encourage caregiver to participate in  daily care.  - Assess support systems available to mother/family.  - Provide social  services/case management support as needed.  Outcome: Progressing

## 2024-05-25 NOTE — PROGRESS NOTES
VAGINAL DELIVERY POSTPARTUM DAY 1    Subjective:   Patient without complaints.  Bleeding normal.  Ambulating without difficulty.  Urinating without difficulty.    Objective:     Vitals:    24 2114   BP: 114/80   Pulse:    Resp: 16   Temp: 98 °F (36.7 °C)     Tmax: Temp (24hrs), Av °F (36.7 °C), Min:97.7 °F (36.5 °C), Max:98.4 °F (36.9 °C)    Abdomen- Uterus firm, non-tender, appropriate size  Extremities- Non-tender, no Summer's sign    Data Review:  Recent Labs   Lab 24  1125   RBC 6.19*   HGB 13.1   HCT 41.6   MCV 67.2*   MCH 21.2*   MCHC 31.5   RDW 18.8   NEPRELIM 6.49   WBC 9.2   .0     Assessment/Plan:   Postpartum Day #1 from /.  Doing well.  Encouraged ambulation.  Plan for discharge to home tomorrow.  Discharge plans/restrictions reviewed with patient and order placed for tomorrow AM    Jose G Rasheed MD  [facility]- Obstetrics & Gynecology

## 2024-05-26 VITALS
DIASTOLIC BLOOD PRESSURE: 81 MMHG | SYSTOLIC BLOOD PRESSURE: 125 MMHG | RESPIRATION RATE: 13 BRPM | HEIGHT: 65 IN | HEART RATE: 83 BPM | WEIGHT: 191 LBS | BODY MASS INDEX: 31.82 KG/M2 | TEMPERATURE: 99 F | OXYGEN SATURATION: 95 %

## 2024-05-26 NOTE — PROGRESS NOTES
Bed: 10  Expected date:   Expected time:   Means of arrival:   Comments:  Alma Noguera code neuro   Discharge patient home as order. Teaching complete, patient feel comfortable in taking care of herself and  infant. Hugs and kisses off. Send both mom and infant to their family car @ 1767.

## 2024-05-27 ENCOUNTER — TELEPHONE (OUTPATIENT)
Dept: OBGYN UNIT | Facility: HOSPITAL | Age: 39
End: 2024-05-27

## 2024-05-28 ENCOUNTER — TELEPHONE (OUTPATIENT)
Dept: OBGYN UNIT | Facility: HOSPITAL | Age: 39
End: 2024-05-28

## 2024-05-28 NOTE — PROGRESS NOTES
Unable to reach mom at this time.  Left message to check [facility] for additional information and to contact MDs with any urgent questions and concerns.

## 2024-06-06 ENCOUNTER — HOSPITAL ENCOUNTER (OUTPATIENT)
Age: 39
Discharge: HOME | End: 2024-06-06
Attending: STUDENT IN AN ORGANIZED HEALTH CARE EDUCATION/TRAINING PROGRAM
Payer: COMMERCIAL

## 2024-06-06 ENCOUNTER — LAB ENCOUNTER (OUTPATIENT)
Dept: LAB | Age: 39
End: 2024-06-06
Attending: STUDENT IN AN ORGANIZED HEALTH CARE EDUCATION/TRAINING PROGRAM
Payer: COMMERCIAL

## 2024-06-06 ENCOUNTER — TELEPHONE (OUTPATIENT)
Dept: OBGYN CLINIC | Facility: CLINIC | Age: 39
End: 2024-06-06

## 2024-06-06 ENCOUNTER — TELEPHONE (OUTPATIENT)
Facility: CLINIC | Age: 39
End: 2024-06-06

## 2024-06-06 VITALS
OXYGEN SATURATION: 100 % | TEMPERATURE: 99 F | HEART RATE: 68 BPM | DIASTOLIC BLOOD PRESSURE: 90 MMHG | SYSTOLIC BLOOD PRESSURE: 134 MMHG | RESPIRATION RATE: 18 BRPM

## 2024-06-06 DIAGNOSIS — M79.602 PAIN OF LEFT UPPER EXTREMITY: ICD-10-CM

## 2024-06-06 DIAGNOSIS — R03.0 ELEVATED BLOOD PRESSURE READING: Primary | ICD-10-CM

## 2024-06-06 DIAGNOSIS — O99.019 ANTEPARTUM ANEMIA (HCC): ICD-10-CM

## 2024-06-06 LAB
ALBUMIN SERPL-MCNC: 4.5 G/DL (ref 3.2–4.8)
ALBUMIN/GLOB SERPL: 1.8 {RATIO} (ref 1–2)
ALP LIVER SERPL-CCNC: 92 U/L
ALT SERPL-CCNC: 47 U/L
ANION GAP SERPL CALC-SCNC: 8 MMOL/L (ref 0–18)
AST SERPL-CCNC: 28 U/L (ref ?–34)
BASOPHILS # BLD AUTO: 0.07 X10(3) UL (ref 0–0.2)
BASOPHILS NFR BLD AUTO: 0.8 %
BILIRUB SERPL-MCNC: 0.4 MG/DL (ref 0.3–1.2)
BUN BLD-MCNC: 19 MG/DL (ref 9–23)
BUN/CREAT SERPL: 27.5 (ref 10–20)
CALCIUM BLD-MCNC: 9.4 MG/DL (ref 8.7–10.4)
CHLORIDE SERPL-SCNC: 107 MMOL/L (ref 98–112)
CO2 SERPL-SCNC: 26 MMOL/L (ref 21–32)
CREAT BLD-MCNC: 0.69 MG/DL
CREAT UR-SCNC: 46.1 MG/DL
DEPRECATED RDW RBC AUTO: 38.4 FL (ref 35.1–46.3)
EGFRCR SERPLBLD CKD-EPI 2021: 114 ML/MIN/1.73M2 (ref 60–?)
EOSINOPHIL # BLD AUTO: 0.23 X10(3) UL (ref 0–0.7)
EOSINOPHIL NFR BLD AUTO: 2.6 %
ERYTHROCYTE [DISTWIDTH] IN BLOOD BY AUTOMATED COUNT: 17.2 % (ref 11–15)
FASTING STATUS PATIENT QL REPORTED: NO
GLOBULIN PLAS-MCNC: 2.5 G/DL (ref 2–3.5)
GLUCOSE BLD-MCNC: 90 MG/DL (ref 70–99)
HCT VFR BLD AUTO: 45.8 %
HGB BLD-MCNC: 14.5 G/DL
IMM GRANULOCYTES # BLD AUTO: 0.02 X10(3) UL (ref 0–1)
IMM GRANULOCYTES NFR BLD: 0.2 %
LYMPHOCYTES # BLD AUTO: 2.68 X10(3) UL (ref 1–4)
LYMPHOCYTES NFR BLD AUTO: 30.8 %
MCH RBC QN AUTO: 21.6 PG (ref 26–34)
MCHC RBC AUTO-ENTMCNC: 31.7 G/DL (ref 31–37)
MCV RBC AUTO: 68.3 FL
MONOCYTES # BLD AUTO: 0.43 X10(3) UL (ref 0.1–1)
MONOCYTES NFR BLD AUTO: 4.9 %
NEUTROPHILS # BLD AUTO: 5.28 X10 (3) UL (ref 1.5–7.7)
NEUTROPHILS # BLD AUTO: 5.28 X10(3) UL (ref 1.5–7.7)
NEUTROPHILS NFR BLD AUTO: 60.7 %
OSMOLALITY SERPL CALC.SUM OF ELEC: 294 MOSM/KG (ref 275–295)
PLATELET # BLD AUTO: 179 10(3)UL (ref 150–450)
POTASSIUM SERPL-SCNC: 4.2 MMOL/L (ref 3.5–5.1)
PROT SERPL-MCNC: 7 G/DL (ref 5.7–8.2)
PROT UR-MCNC: <6 MG/DL (ref ?–14)
RBC # BLD AUTO: 6.71 X10(6)UL
SODIUM SERPL-SCNC: 141 MMOL/L (ref 136–145)
URATE SERPL-MCNC: 4.6 MG/DL
WBC # BLD AUTO: 8.7 X10(3) UL (ref 4–11)

## 2024-06-06 PROCEDURE — 99214 OFFICE O/P EST MOD 30 MIN: CPT

## 2024-06-06 PROCEDURE — 36415 COLL VENOUS BLD VENIPUNCTURE: CPT

## 2024-06-06 PROCEDURE — 84550 ASSAY OF BLOOD/URIC ACID: CPT

## 2024-06-06 PROCEDURE — 85025 COMPLETE CBC W/AUTO DIFF WBC: CPT

## 2024-06-06 PROCEDURE — 99212 OFFICE O/P EST SF 10 MIN: CPT

## 2024-06-06 PROCEDURE — 84156 ASSAY OF PROTEIN URINE: CPT

## 2024-06-06 PROCEDURE — 80053 COMPREHEN METABOLIC PANEL: CPT

## 2024-06-06 PROCEDURE — 82570 ASSAY OF URINE CREATININE: CPT

## 2024-06-06 NOTE — ED INITIAL ASSESSMENT (HPI)
Patient is 2 weeks post partum. Presents with 5 days of pain to left lateral neck radiating into left shoulder, upper back, and down LUE. No trauma. Unable to turn head without severe pain. Taking Advil with little relief.

## 2024-06-06 NOTE — TELEPHONE ENCOUNTER
Pt is about 2 wks pp and complaining of neck and upper shoulder pain. Taking otc pain med but nothing helping.    Future Appointments   Date Time Provider Department Center   7/2/2024  3:45 PM Riccardo Giles MD [facility]

## 2024-06-06 NOTE — TELEPHONE ENCOUNTER
on 24    Patient calling to report left shoulder and neck pain since Saturday. Patient is not sure if it's related to breastfeeding or due to holding her baby. Reports OTC medications not helping. States has tried warm compress and massage and not getting much relief. No other pain or concerns.     Patient advised to follow-up with PCP or Urgent Care/ED for further evaluation. Patient agreeable with plan. No further questions.

## 2024-06-06 NOTE — DISCHARGE INSTRUCTIONS
Your exam and story are concerning for muscle strain for which I recommend rest, elevation when at rest, application of heat pad as discussed.  Please follow-up with your primary care physician within the next 3 days for reassessment and for further recommendations.  If you develop any chest pain or shortness of breath or difficulty breathing present immediately to the emergency department for reassessment.    Your blood pressure was noted to be mildly elevated today, your OB team ordered blood work which they would like you to complete today given elevated blood pressure and just following pregnancy or during pregnancy can lead to life-threatening emergencies.      Continue to monitor your blood pressure from home and follow-up with your OB within 5 days for repeat blood pressure check.  If you develop any headaches, vision changes, vomiting, swelling of the extremities, chest pain, or shortness of breath call 911 or present immediately to the emergency department.

## 2024-06-06 NOTE — TELEPHONE ENCOUNTER
On call MD    Paged by immediate care MD, pt is postpartum s/p  . She is there for arm pain (thought to be musculoskeletal), BP incidentally 130s/90  Will order preE labs - they cannot drawn in immediate care so pt will go to the lab next door  Advise BP check in clinic within 5 days

## 2024-06-06 NOTE — TELEPHONE ENCOUNTER
Called patient to follow up. No answer.   Voicemail left to call the office back.   Phone number provided.

## 2024-06-06 NOTE — ED PROVIDER NOTES
Patient Seen in: [facility]      History     Chief Complaint   Patient presents with    Back Pain     Upper shoulder and neck pain on left side for almost a week. Without advil, becomes headache as well. - Entered by patient     Stated Complaint: Back Pain - Upper shoulder and neck pain on left side for almost a week. Withou*    Subjective:   HPI    39-year-old female who is reportedly prediabetic, who presents with concern for left-sided upper shoulder and neck pain for approximately 1 week.  Per the patient and confirmed per chart review she had an uncomplicated spontaneous vaginal delivery on 2024 with secondary perineal laceration and prolonged unsuccessful epidural attempt.  She denies any trauma, but attributes symptoms to the way she has been holding the baby and breast-feeding.  She points to the left cervical paraspinals and left superior trapezius as to the area of pain.  Pain is noticed with sidebending and rotation of the head to the left and flexion of the head.  She denies difficulty eating or drinking, denies chest pain, shortness of breath, vision changes, headaches, lower extremity edema.    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 This result predicts the deletion of the mu, alpha2, alpha1 and theta1 globin genes on a single chromosome and is consistent with alpha (0) thalassemia (trait). Heterozygosity for this deletion is often associated with mild anemia and microcytosis.    Back pain    Iron deficiency    Microcytic anemia              Past Surgical History:   Procedure Laterality Date      2017    Laparoscopy,pelvic,biopsy Left     remove cyst on ovary                Social History     Socioeconomic History    Marital status:    Tobacco Use    Smoking status: Never    Smokeless tobacco: Never   Vaping Use    Vaping  status: Never Used   Substance and Sexual Activity    Alcohol use: No    Drug use: No    Sexual activity: Not Currently     Partners: Male   Other Topics Concern    Caffeine Concern No     Comment: 2 cups a week    Exercise Yes    Seat Belt Yes    Special Diet No    Reaction to local anesthetic No    Pt has a pacemaker No    Pt has a defibrillator No     Social Determinants of Health     Financial Resource Strain: Low Risk  (5/24/2024)    Financial Resource Strain     Difficulty of Paying Living Expenses: Not hard at all     Med Affordability: No   Food Insecurity: No Food Insecurity (5/24/2024)    Food Insecurity     Food Insecurity: Never true   Transportation Needs: No Transportation Needs (5/24/2024)    Transportation Needs     Lack of Transportation: No     Car Seat: Yes   Stress: No Stress Concern Present (5/24/2024)    Stress     Feeling of Stress : No   Housing Stability: Low Risk  (5/24/2024)    Housing Stability     Housing Instability: No              Review of Systems    Positive for stated complaint: Back Pain - Upper shoulder and neck pain on left side for almost a week. Withou*  Other systems are as noted in HPI.  Constitutional and vital signs reviewed.      All other systems reviewed and negative except as noted above.    Physical Exam     ED Triage Vitals [06/06/24 1555]   /90   Pulse 68   Resp 18   Temp 98.6 °F (37 °C)   Temp src Temporal   SpO2 100 %   O2 Device None (Room air)       Current Vitals:   Vital Signs  BP: 134/90  Pulse: 68  Resp: 18  Temp: 98.6 °F (37 °C)  Temp src: Temporal    Oxygen Therapy  SpO2: 100 %  O2 Device: None (Room air)            Physical Exam    General: Awake, alert, comfortable on room air, in no distress, tolerating oral secretions, interactive  Pulmonary: Lungs CTA B, no wheezing, no conversational dyspnea  Cardiac: +2 B/L regular radial pulses  Neuro: intact sensation and motor function of B/L radial, median, and ulnar nerves, symmetrical facial expressions  on gross observation  Musculoskeletal: 5/5 B/L  strength, intact B/L symmetrical active shoulder abduction and flexion with no reproducible pain, reproducible TTP over the left superior trapezius region and left sternocleidomastoid and left cervical paraspinals with no TTP or step-offs of the midline cervical spine, soft compartments throughout the left upper extremity  HEENT: no periorbital edema or erythema  Skin: No rashes or erythema over the areas of reported pain  Psych: Normal mood, normal affect    ED Course   No labs or imaging performed    MDM   Patient is awake, alert, comfortable on room air, in no distress, lungs CTAB with no wheezing with no signs of acute bronchospasm or reactive airway disease, she is neurovascular intact with soft compartments throughout the left upper extremitiy with no signs of compartment syndrome and no signs of zoster or cellulitis over the areas of pain, she has reproducible TTP over the left sternocleidomastoid and left superior trapezius and left cervical paraspinals consistent with likely muscle strain from holding the baby to breast-feed, there are no step-offs or TTP of the midline cervical spine with no reported trauma to indicate advanced imaging at this time  -Patient is afebrile, she has no lower extremity complaints, no neurologic complaints, and symptoms started about 1 week after epidural with no suspicion at this time for complication from epidural  -We discussed symptomatic management with rest, application of heat pads with a barrier between the heat pad and skin so as to prevent skin injury, and preventing overuse.  Patient is not to stop movement as this can lead to stiffness.  -Patient has been taking over-the-counter Advil which does help her symptoms.  We discussed risks of prolonged use of NSAIDs including gastric ulcers and kidney injury.  Therefore, recommended patient to follow-up with her primary care physician within the next 3 to 5 days for  reassessment of her clinical course and for further recommendations, given limitations associated with further medications in breast-feeding women.  Per lactation Rx, Advil is okay in breast-feeding women.  -Steroid prescription for muscle spasm avoided at this time given concern for possible effect on lactation per lactation Rx, and patient is reportedly prediabetic but did not have gestational diabetes per the patient  -Also messaged patient's primary care physician to discuss close follow-up for reassessment of her clinical course.    Of note, patient's blood pressure is 139/90 with repeat 134/90.  The pt is asymptomatic. Given elevation of diastolic blood pressure with patient less than 6 weeks postpartum, I messaged Dr. Noguera, the obstetric physician who is on-call for the pt's obstetrician.  I discussed the patient's presentation, complaints, vitals, and exam findings with Dr. Noguera.  Given limitations of immediate care, Dr. Noguera has ordered lab work via the outpatient facility which is within the same building as our immediate care and recommends the patient follow-up with her obstetrician within the next 5 days for repeat blood pressure assessment.  I discussed this with the patient, and emphasized the importance of having the blood work performed today.  We discussed ED precautions for preeclampsia such as headache, vision changes, abdominal pain, swelling, or any other concerning signs or symptoms such as chest pain or shortness of breath.  Patient will also monitor her blood pressure from home and with any elevations will call and discuss with her obstetrician versus present to the emergency department.    Medical Decision Making  Amount and/or Complexity of Data Reviewed  External Data Reviewed: notes.     Details: 5/24/24 delivery note  Discussion of management or test interpretation with external provider(s): Discussed with Dr. Noguera    Risk  OTC drugs.        Disposition and Plan      Clinical Impression:  1. Elevated blood pressure reading    2. Pain of left upper extremity         Disposition:  Discharge  6/6/2024  4:50 pm    Follow-up:  Jaimie Royal MD  [address] [facility]  [id number]  [facility] [address] [phone]    In 3 days  As needed, If symptoms worsen    Nely Maher MD  [address] [place] [address] [id number] [phone]      within 5 days at your OB/GYN for blood pressure re-check          Medications Prescribed:  Discharge Medication List as of 6/6/2024  4:50 PM             None

## 2024-06-07 NOTE — TELEPHONE ENCOUNTER
Called and spoke with pt.    Pt. went to  for shoulder muscle  pain/strain possibly related to breast feeding positions used during breast feeding.  When there her blood pressure was noted to be elevated  139/90 and 134//90..was not sure if related to pain while there at  or not.    Dr. Noguera  (OB MD on call) paged by  MD about pt. being at  and her elevated BP, so pre-eclampsia labs ordered along with BP check to be done in our office within 5 days.  No history of elevated blood pressure with pregnancy. Or pre-eclampsia symptoms while inpatient for delivery. Uneventful per pt.\    Del ... 24.    When speaking with pt. And asking her about if she has been experiencing any pre-eclampsia symptoms. She c/o of headache, thinking it is more related to strain/muscle soreness in her neck and shoulders.  It does improve with otc medication , also recommended heating pad to site and changing breasting positions when breast feeding ..trying pillows for support also.  Pre-eclampsia symptoms reviewed with pt. And denies unusual swelling , visual disturbances,epigastric pain or RUQ pain, SOB,  Recommended that she start keeping a log of her bp's and to take BID and bring bp log infor to her bp check which is scheduled for Monday 6/10/24 in [place] office at 10 am with nurses.  ER precautions reviewed for pre-eclampsia and BP parameters also given  140/90 because she is not on any bp medication.  Pt. Verbalizes understanding of info and agrees to plan.

## 2024-06-10 ENCOUNTER — NURSE ONLY (OUTPATIENT)
Dept: OBGYN CLINIC | Facility: CLINIC | Age: 39
End: 2024-06-10
Payer: COMMERCIAL

## 2024-06-10 VITALS
WEIGHT: 174 LBS | HEART RATE: 81 BPM | SYSTOLIC BLOOD PRESSURE: 116 MMHG | DIASTOLIC BLOOD PRESSURE: 78 MMHG | BODY MASS INDEX: 29 KG/M2

## 2024-06-10 NOTE — PROGRESS NOTES
Pt. Here for blood pressure check.  F/U from  visit 6/6/24  where pt's BP was elevated, 130's /80's  d/t neck/shoulder discomfort and strain ..BP f/u was recommended by MD on call.    Pt. says her neck/shoulder discomfort has been improving since she is using more support with breast feeding, and varying her breast feeding positions.  Denies any pre-eclampsia symptoms .BP's at home 117's /80's Occasionally will have a headache with neck/shoulder pain but is relieved with Advil, fluids and and rest.  Case reviewed with Dr. Molina, pt. can discontinue BP checks and home and f/u at 6 week PP check.  Call with any questions or further concerns.pt. verbalizes understanding and agrees to plan.

## 2024-06-11 ENCOUNTER — TELEPHONE (OUTPATIENT)
Dept: FAMILY MEDICINE CLINIC | Facility: CLINIC | Age: 39
End: 2024-06-11

## 2024-06-12 NOTE — TELEPHONE ENCOUNTER
Please reach out to patient and have her follow-up with me if I am still her PCP. Can use res24    Recently seen in the ER:   'atraumatic left-sided shoulder and neck pain.  Exam is consistent with likely muscle strain with reproducible tenderness to palpation of the left superior trapezius, sternocleidomastoid region, and cervical paraspinals.  She attributes symptoms to the way she has been holding the baby to breast-feed.  She has been taking Advil and applying heat to minimal relief.  I encouraged symptomatic management, but given limitations with breast-feeding and patient has been taking Advil, would like her to have close follow-up for reassessment of her clinical course.     Of note, her blood pressure was 139/90 on multiple assessments today.  She was asymptomatic, but given she is 2 weeks postpartum I did contact OB who is performing lab work to assess for preeclampsia. '

## 2024-06-12 NOTE — TELEPHONE ENCOUNTER
Patient phoned back and was informed of the message below. Patient did schedule an appointment to see Dr. Royal on 6-18-24 a res 24 was used. This was approved per the message below.

## 2024-06-12 NOTE — TELEPHONE ENCOUNTER
Patient called back and states her symptoms have resolved.  Pain free.  Blood pressure at gyn visit returned to normal.  Inquires if she should keep follow up.  Nurse advised yes to reckeck the blood pressure, she requests message be sent to provider.

## 2024-06-18 ENCOUNTER — OFFICE VISIT (OUTPATIENT)
Dept: FAMILY MEDICINE CLINIC | Facility: CLINIC | Age: 39
End: 2024-06-18

## 2024-06-18 VITALS
SYSTOLIC BLOOD PRESSURE: 121 MMHG | WEIGHT: 175 LBS | BODY MASS INDEX: 29 KG/M2 | DIASTOLIC BLOOD PRESSURE: 82 MMHG | HEART RATE: 83 BPM

## 2024-06-18 DIAGNOSIS — E55.9 VITAMIN D DEFICIENCY: ICD-10-CM

## 2024-06-18 DIAGNOSIS — Z00.00 ENCOUNTER FOR ANNUAL HEALTH EXAMINATION: Primary | ICD-10-CM

## 2024-06-18 DIAGNOSIS — R03.0 ELEVATED BLOOD PRESSURE READING: ICD-10-CM

## 2024-06-18 PROCEDURE — 99395 PREV VISIT EST AGE 18-39: CPT | Performed by: FAMILY MEDICINE

## 2024-06-18 NOTE — PROGRESS NOTES
HPI:   Kal Cason is a 39 year old female who presents for a complete physical exam.    3 weeks postpartum.  Has 4 children now.  She is .  Some elevated blood pressures were noted in the urgent care postpartum.  She is currently breast-feeding.  Will take ibuprofen for occasional back pain.    Wt Readings from Last 3 Encounters:   24 175 lb (79.4 kg)   06/10/24 174 lb (78.9 kg)   24 191 lb (86.6 kg)     Body mass index is 29.12 kg/m².       Current Outpatient Medications   Medication Sig Dispense Refill    Prenatal Vit-Fe Fumarate-FA (PRENATAL VITAMINS PLUS) 27-1 MG Oral Tab Take 1 tablet by mouth daily.        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 This result predicts the deletion of the mu, alpha2, alpha1 and theta1 globin genes on a single chromosome and is consistent with alpha (0) thalassemia (trait). Heterozygosity for this deletion is often associated with mild anemia and microcytosis.    Back pain    Iron deficiency    Microcytic anemia      Past Surgical History:   Procedure Laterality Date          Laparoscopy,pelvic,biopsy Left     remove cyst on ovary      Family History   Problem Relation Age of Onset    Diabetes Father     Lipids Father     Hypertension Mother     Thyroid Disorder Mother     Breast Cancer Mother     High Cholesterol Mother     Other (Other) Mother         back cyst removed    Thyroid Disorder Sister     Other (Other) Sister         brain cyst removed after pregnancy      Social History:   Social History     Socioeconomic History    Marital status:    Tobacco Use    Smoking status: Never     Passive exposure: Never    Smokeless tobacco: Never   Vaping Use    Vaping status: Never Used   Substance and Sexual Activity    Alcohol use: No    Drug use: No    Sexual activity: Not Currently     Partners: Male   Other  Topics Concern    Caffeine Concern No     Comment: 2 cups a week    Exercise Yes    Seat Belt Yes    Special Diet No    Reaction to local anesthetic No    Pt has a pacemaker No    Pt has a defibrillator No     Social Determinants of Health     Financial Resource Strain: Low Risk  (5/24/2024)    Financial Resource Strain     Difficulty of Paying Living Expenses: Not hard at all     Med Affordability: No   Food Insecurity: No Food Insecurity (5/24/2024)    Food Insecurity     Food Insecurity: Never true   Transportation Needs: No Transportation Needs (5/24/2024)    Transportation Needs     Lack of Transportation: No     Car Seat: Yes   Stress: No Stress Concern Present (5/24/2024)    Stress     Feeling of Stress : No   Housing Stability: Low Risk  (5/24/2024)    Housing Stability     Housing Instability: No          REVIEW OF SYSTEMS:   Negative, except per HPI.     EXAM:   /82   Pulse 83   Wt 175 lb (79.4 kg)   LMP 08/25/2023   BMI 29.12 kg/m²     GENERAL: well developed, well nourished, in no apparent distress  SKIN: no rashes, no suspicious lesions  HEENT: atraumatic, normocephalic, ears are clear  EYES: PERRLA, EOMI,conjunctiva are clear  NECK: supple, no adenopathy  LUNGS: clear to auscultation  CARDIO: RRR without murmur  GI: good BS, no masses or tenderness  MUSCULOSKELETAL: back is not tender, FROM of the back  EXTREMITIES: no cyanosis or edema  NEURO: Oriented times three, cranial nerves are intact, motor and sensory are grossly intact    ASSESSMENT AND PLAN:   Kal Cason is a 39 year old female who presents for a complete physical exam.    1. Encounter for annual health examination  -Medical, surgical and social history, as well as medications and allergies were reviewed with patient.  - CBC With Differential With Platelet; Future  - Comp Metabolic Panel (14); Future  - Hemoglobin A1C; Future  - Lipid Panel; Future  - TSH W Reflex To Free T4; Future  - Vitamin D; Future    2. Elevated blood  pressure reading  -Blood pressure normal in the office today.  - Comp Metabolic Panel (14); Future    3. Vitamin D deficiency  - Vitamin D; Future       RTC if no improvement in symptoms. Red flags discussed to go to [facility].     Jaimie Royal MD  6/18/2024  3:52 PM

## 2024-07-02 ENCOUNTER — POSTPARTUM (OUTPATIENT)
Dept: OBGYN CLINIC | Facility: CLINIC | Age: 39
End: 2024-07-02
Payer: COMMERCIAL

## 2024-07-02 VITALS
SYSTOLIC BLOOD PRESSURE: 120 MMHG | BODY MASS INDEX: 29 KG/M2 | DIASTOLIC BLOOD PRESSURE: 74 MMHG | HEART RATE: 105 BPM | WEIGHT: 175.81 LBS

## 2024-07-02 DIAGNOSIS — R10.2 PELVIC PAIN: Primary | ICD-10-CM

## 2024-07-02 PROBLEM — O99.019 ANTEPARTUM ANEMIA (HCC): Status: RESOLVED | Noted: 2024-03-19 | Resolved: 2024-07-02

## 2024-07-02 PROBLEM — O34.219 DESIRES VBAC (VAGINAL BIRTH AFTER CESAREAN) TRIAL (HCC): Status: RESOLVED | Noted: 2024-05-24 | Resolved: 2024-07-02

## 2024-07-02 PROBLEM — O99.820 GBS (GROUP B STREPTOCOCCUS CARRIER), +RV CULTURE, CURRENTLY PREGNANT (HCC): Status: RESOLVED | Noted: 2024-05-10 | Resolved: 2024-07-02

## 2024-07-02 PROBLEM — Z34.90 PREGNANCY (HCC): Status: RESOLVED | Noted: 2024-05-24 | Resolved: 2024-07-02

## 2024-07-02 PROBLEM — O09.891 MEDICATION EXPOSURE DURING FIRST TRIMESTER OF PREGNANCY (HCC): Status: RESOLVED | Noted: 2023-11-29 | Resolved: 2024-07-02

## 2024-07-02 PROBLEM — O34.219 HISTORY OF CESAREAN DELIVERY AFFECTING PREGNANCY (HCC): Status: RESOLVED | Noted: 2024-05-24 | Resolved: 2024-07-02

## 2024-07-02 PROBLEM — R00.0 TACHYCARDIA: Status: RESOLVED | Noted: 2024-03-20 | Resolved: 2024-07-02

## 2024-07-02 PROBLEM — O99.810 ABNORMAL MATERNAL GLUCOSE TOLERANCE, ANTEPARTUM (HCC): Status: RESOLVED | Noted: 2024-02-27 | Resolved: 2024-07-02

## 2024-07-02 PROBLEM — O09.529 AMA (ADVANCED MATERNAL AGE) MULTIGRAVIDA 35+ (HCC): Status: RESOLVED | Noted: 2021-11-03 | Resolved: 2024-07-02

## 2024-07-02 PROBLEM — O34.219 VBAC (VAGINAL BIRTH AFTER CESAREAN) (HCC): Status: RESOLVED | Noted: 2024-05-24 | Resolved: 2024-07-02

## 2024-07-02 NOTE — PATIENT INSTRUCTIONS
Your physician has recommended you to [facility].  If your insurance requires you to obtain a managed care referral, please allow 3 working days from the date of this order for the referral to be processed.  Please wait 3 working days to schedule your appointment unless notified.       [facility] in [address] [address]               [phone]

## 2024-07-02 NOTE — PROGRESS NOTES
[facility]  Obstetrics and Gynecology   Postpartum Progress Note    Subjective:     Kal Cason is a 39 year old  female who is s/p  on . Her pregnancy was complicated by AMA, hx of CS with  . She reports doing well. Baby  doing well and breastfeeding. The patient reports vagina bleeding stopped. The patient denies emotional concerns. She reports back and pelvic pain. Did have difficult epidurals.     Review of Systems:  General:  denies fevers, chills, fatigue and malaise.   Respiratory:  denies SOB, dyspnea, cough or wheezing  Cardiovascular:  denies chest pain, palpitations, exercise intolerance   GI: denies abdominal pain, diarrhea, constipation  :  denies dysuria, hematuria, increased urinary frequency.  denies abnormal uterine bleeding or vaginal discharge.       Objective:     Vitals:    24 1546   BP: 120/74   Pulse: 105   Weight: 175 lb 12.8 oz (79.7 kg)         Body mass index is 29.25 kg/m².    General: AAO.NAD.   CVS exam: normal peripheral perfusion  Chest: non-labored breathing, no tachypnea   Abdominal exam: soft, nontender, nondistended  Pelvic exam:   VULVA: normal appearing vulva with no masses, tenderness or lesions  PERINEUM: intact, well healed, no signs of infection.   VAGINA: normal appearing vagina with normal color and discharge, no lesions  CERVIX: normal appearing cervix without discharge or lesions  UTERUS: uterus is normal size, shape, consistency and nontender  ADNEXA: normal adnexa in size, nontender and no masses  Ext: non-tender, no edema    Labs:         Assessment:     Kal Cason is a 39 year old  female who presents for postpartum visit   Patient Active Problem List   Diagnosis    Prediabetes    Hypercholesteremia    Hx successful  (vaginal birth after ), currently pregnant ([facility])         Plan:     Postpartum exam   - s/p   - doing well,  no complaints   - no abnormal findings on physical exam   - may return to  normal activity   Contraception counseling   - discussion held with patient about family planning and contraception,  had vasectomy  - pt reports back and pelvic pain-->Pelvic floor PT  -pap 11/2023 NILM HPV neg    All of the findings and plan were discussed with the patient.  She notes understanding and agrees with the plan of care.  All questions were answered to the best of my ability at this time.    RTC in 1 yr for well woman exam or sooner if needed     Riccardo Giles MD        Note to patient and family   The 21st Century Cures Act makes medical notes available to patients in the interest of transparency.  However, please be advised that this is a medical document.  It is intended as [id number] communication.  It is written and medical language may contain abbreviations or verbiage that are technical and unfamiliar.  It may appear blunt or direct.  Medical documents are intended to carry relevant information, facts as evident, and the clinical opinion of the practitioner.

## 2024-08-10 ENCOUNTER — NURSE TRIAGE (OUTPATIENT)
Dept: FAMILY MEDICINE CLINIC | Facility: CLINIC | Age: 39
End: 2024-08-10

## 2024-08-10 NOTE — TELEPHONE ENCOUNTER
[facility] the [facility] concern:    Numbness in right hand while sleeping.

## 2024-08-12 NOTE — TELEPHONE ENCOUNTER
2nd attempt  Left message to call back.  [employer] message was sent.    Future Appointments   Date Time Provider Department Center   8/16/2024  8:30 AM [facility]

## 2024-08-13 NOTE — TELEPHONE ENCOUNTER
Action Requested: Summary for Provider     []  Critical Lab, Recommendations Needed  [] Need Additional Advice  []   FYI    []   Need Orders  [] Need Medications Sent to Pharmacy  []  Other     SUMMARY: Per Protocol disposition advised to keep appt already scheduled for  for intermittent symptoms.      Future Appointments   Date Time Provider Department Center   2024  8:30 AM Jaimie Royal MD [facility]     Reason for call: Acute  Onset: over one month      Patient (name and  verified) c/o intermittent numbness to the right arm up to her elbow. Patient denies any discoloration to the skin, rash, wounds, redness, color changes or swelling. Denies any chest pain or shortness of breath. Denies any shoulder injury. Denies any pain.  Reason for Disposition   Numbness or tingling in one or both hands is a chronic symptom (recurrent or ongoing problem lasting > 4 weeks)    Protocols used: Neurologic Deficit-A-OH

## 2024-08-16 ENCOUNTER — OFFICE VISIT (OUTPATIENT)
Dept: FAMILY MEDICINE CLINIC | Facility: CLINIC | Age: 39
End: 2024-08-16
Payer: COMMERCIAL

## 2024-08-16 VITALS
SYSTOLIC BLOOD PRESSURE: 107 MMHG | BODY MASS INDEX: 30 KG/M2 | WEIGHT: 182 LBS | DIASTOLIC BLOOD PRESSURE: 72 MMHG | HEART RATE: 66 BPM

## 2024-08-16 DIAGNOSIS — G56.01 CARPAL TUNNEL SYNDROME OF RIGHT WRIST: Primary | ICD-10-CM

## 2024-08-16 DIAGNOSIS — E78.00 HYPERCHOLESTEREMIA: ICD-10-CM

## 2024-08-16 DIAGNOSIS — R73.03 PREDIABETES: ICD-10-CM

## 2024-08-16 PROCEDURE — 99214 OFFICE O/P EST MOD 30 MIN: CPT | Performed by: FAMILY MEDICINE

## 2024-08-16 NOTE — PROGRESS NOTES
Chief Complaint   Patient presents with    Numbness     C/o right arm numbness while sleeping       HPI:   Kal Cason is a 39 year old female who presents to clinic with complaints of right hand numbness and tingling affecting her 1st through 3rd fingers, she wakes up.  Currently caring for her 2-month-old and breast-feeding.  Has other multiple small children at home.    REVIEW OF SYSTEMS:   Negative, except per HPI.     EXAM:   /72 (BP Location: Right arm, Patient Position: Sitting, Cuff Size: adult)   Pulse 66   Wt 182 lb (82.6 kg)   LMP 08/25/2023 (Exact Date)   Breastfeeding Yes   BMI 30.29 kg/m²   Body mass index is 30.29 kg/m².  GENERAL: well developed, well nourished, in no apparent distress  SKIN: no rashes, no suspicious lesions  HEENT: atraumatic, normocephalic  EYES: PERRLA, EOMI,conjunctiva are clear  NECK: supple, no adenopathy    ASSESSMENT AND PLAN:     1. Carpal tunnel syndrome of right wrist  - nsaids prn, icing.   - R wrist brace immobilizer given   - if no improvement will send to PT    2. Hypercholesteremia  - Stable condition, continue present management.    Will recheck labs at next annual physical    3. Prediabetes  - Stable condition, continue present management.    Will recheck labs at next annual physical     RTC if no improvement in symptoms. Red flags discussed to go to [facility] Royal MD  8/16/2024  8:45 AM         No
